# Patient Record
Sex: FEMALE | Race: BLACK OR AFRICAN AMERICAN | NOT HISPANIC OR LATINO | URBAN - METROPOLITAN AREA
[De-identification: names, ages, dates, MRNs, and addresses within clinical notes are randomized per-mention and may not be internally consistent; named-entity substitution may affect disease eponyms.]

---

## 2021-06-28 PROCEDURE — 99283 EMERGENCY DEPT VISIT LOW MDM: CPT | Mod: 25

## 2021-06-29 ENCOUNTER — APPOINTMENT (EMERGENCY)
Dept: RADIOLOGY | Facility: HOSPITAL | Age: 26
End: 2021-06-29
Attending: EMERGENCY MEDICINE
Payer: COMMERCIAL

## 2021-06-29 ENCOUNTER — HOSPITAL ENCOUNTER (EMERGENCY)
Facility: HOSPITAL | Age: 26
Discharge: HOME | End: 2021-06-29
Attending: EMERGENCY MEDICINE
Payer: COMMERCIAL

## 2021-06-29 VITALS
HEART RATE: 78 BPM | OXYGEN SATURATION: 98 % | DIASTOLIC BLOOD PRESSURE: 87 MMHG | RESPIRATION RATE: 16 BRPM | TEMPERATURE: 98 F | SYSTOLIC BLOOD PRESSURE: 120 MMHG

## 2021-06-29 DIAGNOSIS — R11.2 NON-INTRACTABLE VOMITING WITH NAUSEA, UNSPECIFIED VOMITING TYPE: ICD-10-CM

## 2021-06-29 DIAGNOSIS — Z3A.01 LESS THAN 8 WEEKS GESTATION OF PREGNANCY: Primary | ICD-10-CM

## 2021-06-29 DIAGNOSIS — R07.89 ATYPICAL CHEST PAIN: ICD-10-CM

## 2021-06-29 LAB
ANION GAP SERPL CALC-SCNC: 8 MEQ/L (ref 3–15)
BASOPHILS # BLD: 0.06 K/UL (ref 0.01–0.1)
BASOPHILS NFR BLD: 0.4 %
BUN SERPL-MCNC: 6 MG/DL (ref 8–20)
CALCIUM SERPL-MCNC: 9.6 MG/DL (ref 8.9–10.3)
CHLORIDE SERPL-SCNC: 99 MEQ/L (ref 98–109)
CO2 SERPL-SCNC: 25 MEQ/L (ref 22–32)
CREAT SERPL-MCNC: 0.8 MG/DL (ref 0.6–1.1)
DIFFERENTIAL METHOD BLD: ABNORMAL
EOSINOPHIL # BLD: 0.08 K/UL (ref 0.04–0.36)
EOSINOPHIL NFR BLD: 0.6 %
ERYTHROCYTE [DISTWIDTH] IN BLOOD BY AUTOMATED COUNT: 13.3 % (ref 11.7–14.4)
GFR SERPL CREATININE-BSD FRML MDRD: >60 ML/MIN/1.73M*2
GLUCOSE SERPL-MCNC: 98 MG/DL (ref 70–99)
HCG UR QL: POSITIVE
HCT VFR BLDCO AUTO: 36.3 % (ref 35–45)
HGB BLD-MCNC: 11.7 G/DL (ref 11.8–15.7)
IMM GRANULOCYTES # BLD AUTO: 0.03 K/UL (ref 0–0.08)
IMM GRANULOCYTES NFR BLD AUTO: 0.2 %
LYMPHOCYTES # BLD: 2.34 K/UL (ref 1.2–3.5)
LYMPHOCYTES NFR BLD: 17.3 %
MCH RBC QN AUTO: 24.7 PG (ref 28–33.2)
MCHC RBC AUTO-ENTMCNC: 32.2 G/DL (ref 32.2–35.5)
MCV RBC AUTO: 76.6 FL (ref 83–98)
MONOCYTES # BLD: 0.69 K/UL (ref 0.28–0.8)
MONOCYTES NFR BLD: 5.1 %
NEUTROPHILS # BLD: 10.31 K/UL (ref 1.7–7)
NEUTS SEG NFR BLD: 76.4 %
NRBC BLD-RTO: 0 %
PDW BLD AUTO: 10.1 FL (ref 9.4–12.3)
PLATELET # BLD AUTO: 346 K/UL (ref 150–369)
POTASSIUM SERPL-SCNC: 3.9 MEQ/L (ref 3.6–5.1)
RBC # BLD AUTO: 4.74 M/UL (ref 3.93–5.22)
SODIUM SERPL-SCNC: 132 MEQ/L (ref 136–144)
WBC # BLD AUTO: 13.51 K/UL (ref 3.8–10.5)

## 2021-06-29 PROCEDURE — 36415 COLL VENOUS BLD VENIPUNCTURE: CPT

## 2021-06-29 PROCEDURE — 71045 X-RAY EXAM CHEST 1 VIEW: CPT

## 2021-06-29 PROCEDURE — 63700000 HC SELF-ADMINISTRABLE DRUG: Performed by: EMERGENCY MEDICINE

## 2021-06-29 PROCEDURE — 82947 ASSAY GLUCOSE BLOOD QUANT: CPT

## 2021-06-29 PROCEDURE — 84703 CHORIONIC GONADOTROPIN ASSAY: CPT | Performed by: EMERGENCY MEDICINE

## 2021-06-29 PROCEDURE — 93005 ELECTROCARDIOGRAM TRACING: CPT | Performed by: EMERGENCY MEDICINE

## 2021-06-29 PROCEDURE — 80048 BASIC METABOLIC PNL TOTAL CA: CPT | Performed by: EMERGENCY MEDICINE

## 2021-06-29 PROCEDURE — 84703 CHORIONIC GONADOTROPIN ASSAY: CPT

## 2021-06-29 PROCEDURE — 80048 BASIC METABOLIC PNL TOTAL CA: CPT

## 2021-06-29 PROCEDURE — 85025 COMPLETE CBC W/AUTO DIFF WBC: CPT | Performed by: EMERGENCY MEDICINE

## 2021-06-29 PROCEDURE — 85025 COMPLETE CBC W/AUTO DIFF WBC: CPT

## 2021-06-29 RX ORDER — ONDANSETRON 4 MG/1
4 TABLET, ORALLY DISINTEGRATING ORAL EVERY 8 HOURS PRN
Qty: 12 TABLET | Refills: 0 | Status: SHIPPED | OUTPATIENT
Start: 2021-06-29 | End: 2022-11-06

## 2021-06-29 RX ORDER — FAMOTIDINE 20 MG/1
20 TABLET, FILM COATED ORAL ONCE
Status: COMPLETED | OUTPATIENT
Start: 2021-06-29 | End: 2021-06-29

## 2021-06-29 RX ADMIN — FAMOTIDINE 20 MG: 20 TABLET ORAL at 03:00

## 2021-06-29 ASSESSMENT — ENCOUNTER SYMPTOMS: VOMITING: 1

## 2021-06-29 NOTE — ED ATTESTATION NOTE
I have personally seen and examined the patient.  I reviewed and agree with physician assistant / nurse practitioner’s assessment and plan of care    My examination, assessment, and plan of care of  is as follows:     Patient has had nausea and vomiting for the last 3 days.  She was unaware that she was pregnant her last menstrual period was about 6 weeks ago.  No diarrhea.  She does have some mild blood streaks in last vomit.  She complains of mild chest discomfort and shortness of breath.  Pain is not pleuritic pain is somewhat reproducible with palpation no lower extremity tenderness or swelling in the lower extremities no history of DVT    Exam  Patient is awake alert oriented no acute distress  Lungs clear bilateral auscultation  Heart regular rate and rhythm  Abdomen soft nontender nondistended  No lower extremity edema no calf tenderness no cords palpated    Plan  Lab work is fairly unremarkable, patient is pregnant was explains her nausea and vomiting will check chest x-ray and EKG at patient's request     Ryne Bailon MD  06/29/21 6908

## 2021-06-29 NOTE — ED PROVIDER NOTES
Emergency Medicine Note  HPI   HISTORY OF PRESENT ILLNESS       The patient is a 26-year-old female with no significant past medical history who presents emergency room with multiple complaints.  She states that she has had 2 weeks of nausea and vomiting when she eats.  She states that she feels like there is a knot in her throat and the food gets stuck when she eats.  She states that over the past 3 days symptoms have gotten worse.  She also states that she has had some nasal congestion and chest pain.  She states that tonight she noticed streaks of blood in the vomit.  She has not had any fever, chills, cough, diarrhea, change in urination.           History provided by:  Patient  Vomiting  Associated symptoms: abdominal pain    Associated symptoms: no cough and no headaches          Patient History   PAST HISTORY     Reviewed from Nursing Triage:      History reviewed. No pertinent past medical history.    History reviewed. No pertinent surgical history.    History reviewed. No pertinent family history.    Social History     Tobacco Use   • Smoking status: Never Smoker   Substance Use Topics   • Alcohol use: Never   • Drug use: Never         Review of Systems   REVIEW OF SYSTEMS     Review of Systems   Constitutional: Positive for fatigue.   HENT: Positive for congestion.    Respiratory: Positive for shortness of breath. Negative for cough and choking.    Cardiovascular: Positive for chest pain. Negative for palpitations and leg swelling.   Gastrointestinal: Positive for abdominal pain, nausea and vomiting.   Genitourinary: Negative for difficulty urinating, dysuria, urgency and vaginal bleeding.   Musculoskeletal: Negative for back pain, neck pain and neck stiffness.   Neurological: Negative for syncope, light-headedness, numbness and headaches.         VITALS     ED Vitals    Date/Time Temp Pulse Resp BP SpO2 [facility]   06/29/21 0340 36.7 °C (98 °F) 78 16 120/87 98 % MMM   06/29/21 0000 37.2 °C (98.9 °F) 70 16  118/60 99 % MJM        Pulse Ox %: 99 % (06/29/21 0209)  Pulse Ox Interpretation: Normal (06/29/21 0209)  Heart Rate: 70 (06/29/21 0209)  Rhythm Strip Interpretation: Normal Sinus Rhythm (06/29/21 0209)     Physical Exam   PHYSICAL EXAM     Physical Exam  Vitals and nursing note reviewed.   Constitutional:       General: She is not in acute distress.     Appearance: Normal appearance. She is well-developed.   HENT:      Head: Atraumatic.      Mouth/Throat:      Lips: Pink.      Mouth: Mucous membranes are moist.      Pharynx: Oropharynx is clear.   Eyes:      Extraocular Movements: Extraocular movements intact.      Conjunctiva/sclera: Conjunctivae normal.      Pupils: Pupils are equal, round, and reactive to light.   Cardiovascular:      Rate and Rhythm: Normal rate and regular rhythm.      Pulses: Normal pulses.      Heart sounds: Normal heart sounds.      Comments: No calf tenderness or cords palpated  Pulmonary:      Effort: Pulmonary effort is normal. No respiratory distress.      Breath sounds: Normal breath sounds. No wheezing, rhonchi or rales.   Chest:      Chest wall: No tenderness.   Abdominal:      General: Abdomen is flat.      Tenderness: There is no abdominal tenderness. There is no right CVA tenderness, left CVA tenderness, guarding or rebound.   Musculoskeletal:         General: No deformity. Normal range of motion.      Cervical back: Normal range of motion and neck supple. No rigidity.      Right lower leg: No edema.      Left lower leg: No edema.   Skin:     General: Skin is warm and dry.      Capillary Refill: Capillary refill takes less than 2 seconds.   Neurological:      General: No focal deficit present.      Mental Status: She is alert and oriented to person, place, and time.   Psychiatric:         Behavior: Behavior normal. Behavior is cooperative.           PROCEDURES     Procedures     DATA     Results     Procedure Component Value Units Date/Time    Basic metabolic panel [[id number]]   (Abnormal) Collected: 06/29/21 0113    Specimen: Blood, Venous Updated: 06/29/21 0145     Sodium 132 mEQ/L      Potassium 3.9 mEQ/L      Comment: Results obtained on plasma. Plasma Potassium values may be up to 0.4 mEQ/L less than serum values. The differences may be greater for patients with high platelet or white cell counts.        Chloride 99 mEQ/L      CO2 25 mEQ/L      BUN 6 mg/dL      Creatinine 0.8 mg/dL      Glucose 98 mg/dL      Calcium 9.6 mg/dL      eGFR >60.0 mL/min/1.73m*2      Anion Gap 8 mEQ/L     BhCG, Serum, Qual [[id number]]  (Abnormal) Collected: 06/29/21 0113    Specimen: Blood, Venous Updated: 06/29/21 0143     Preg Test, Serum Positive    CBC and differential [[id number]]  (Abnormal) Collected: 06/29/21 0113    Specimen: Blood, Venous Updated: 06/29/21 0120     WBC 13.51 K/uL      RBC 4.74 M/uL      Hemoglobin 11.7 g/dL      Hematocrit 36.3 %      MCV 76.6 fL      MCH 24.7 pg      MCHC 32.2 g/dL      RDW 13.3 %      Platelets 346 K/uL      MPV 10.1 fL      Differential Type Auto     nRBC 0.0 %      Immature Granulocytes 0.2 %      Neutrophils 76.4 %      Lymphocytes 17.3 %      Monocytes 5.1 %      Eosinophils 0.6 %      Basophils 0.4 %      Immature Granulocytes, Absolute 0.03 K/uL      Neutrophils, Absolute 10.31 K/uL      Lymphocytes, Absolute 2.34 K/uL      Monocytes, Absolute 0.69 K/uL      Eosinophils, Absolute 0.08 K/uL      Basophils, Absolute 0.06 K/uL           Imaging Results          X-RAY CHEST 1 VIEW (Final result)  Result time 06/29/21 07:56:57    Final result                 Impression:    IMPRESSION:   Radiographically normal chest.    COMMENT:   A single portable AP erect chest radiograph is interpreted without  comparison. The cardiomediastinal contour is normal. The lungs are clear  bilaterally. The osseous thorax and surrounding soft tissues are unremarkable.               Narrative:    CLINICAL HISTORY:    26-year-old female with chest pain.                    ED  Interpretation    Chest x-ray is unremarkable                              ECG 12 lead   ED Interpretation   EKG shows a normal sinus rhythm at a rate of 61 bpm with normal axis normal ST segments and normal T waves                Scoring tools                                 ED Course & MDM   MDM / ED COURSE and CLINICAL IMPRESSIONS     MDM    ED Course as of Jun 30 0114 Tue Jun 29, 2021   0233 Patient is aware that she is pregnant her mom is aware at this time.  She will mom in the room to have discussion.  Patient wants a chest x-ray and EKG for her very minimal chest discomfort.  I explained there is some risk to the baby she insisted on the x-ray.   Preg Test, Serum(!): Positive [RS]   0257 Chest x-ray is unremarkable EKG unremarkable lab work is unremarkable patient has not vomited here will discharge with Zofran and follow-up with OB.    [RS]      ED Course User Index  [RS] Ryne Bailon MD         Clinical Impressions as of Jun 30 0114   Less than 8 weeks gestation of pregnancy   Non-intractable vomiting with nausea, unspecified vomiting type   Atypical chest pain            Hilary Galvin PA C  06/30/21 0114

## 2021-06-30 LAB
ATRIAL RATE: 61
P AXIS: 39
PR INTERVAL: 162
QRS DURATION: 82
QT INTERVAL: 388
QTC CALCULATION(BAZETT): 390
R AXIS: 56
T WAVE AXIS: 12
VENTRICULAR RATE: 61

## 2021-06-30 ASSESSMENT — ENCOUNTER SYMPTOMS
LIGHT-HEADEDNESS: 0
BACK PAIN: 0
NUMBNESS: 0
FATIGUE: 1
DIFFICULTY URINATING: 0
ABDOMINAL PAIN: 1
COUGH: 0
NECK STIFFNESS: 0
PALPITATIONS: 0
NECK PAIN: 0
DYSURIA: 0
NAUSEA: 1
HEADACHES: 0
CHOKING: 0
SHORTNESS OF BREATH: 1

## 2022-11-06 ENCOUNTER — APPOINTMENT (EMERGENCY)
Dept: RADIOLOGY | Facility: HOSPITAL | Age: 27
End: 2022-11-06
Payer: COMMERCIAL

## 2022-11-06 ENCOUNTER — HOSPITAL ENCOUNTER (EMERGENCY)
Facility: HOSPITAL | Age: 27
Discharge: HOME | End: 2022-11-07
Attending: STUDENT IN AN ORGANIZED HEALTH CARE EDUCATION/TRAINING PROGRAM | Admitting: STUDENT IN AN ORGANIZED HEALTH CARE EDUCATION/TRAINING PROGRAM
Payer: COMMERCIAL

## 2022-11-06 DIAGNOSIS — O21.0 HYPEREMESIS OF PREGNANCY: Primary | ICD-10-CM

## 2022-11-06 LAB
ANION GAP SERPL CALC-SCNC: 12 MEQ/L (ref 3–15)
BASOPHILS # BLD: 0.05 K/UL (ref 0.01–0.1)
BASOPHILS NFR BLD: 0.4 %
BUN SERPL-MCNC: 8 MG/DL (ref 8–20)
CALCIUM SERPL-MCNC: 9.7 MG/DL (ref 8.9–10.3)
CHLORIDE SERPL-SCNC: 100 MEQ/L (ref 98–109)
CO2 SERPL-SCNC: 21 MEQ/L (ref 22–32)
CREAT SERPL-MCNC: 0.7 MG/DL (ref 0.6–1.1)
DIFFERENTIAL METHOD BLD: ABNORMAL
EOSINOPHIL # BLD: 0.08 K/UL (ref 0.04–0.36)
EOSINOPHIL NFR BLD: 0.7 %
ERYTHROCYTE [DISTWIDTH] IN BLOOD BY AUTOMATED COUNT: 13.9 % (ref 11.7–14.4)
FLUAV RNA SPEC QL NAA+PROBE: NEGATIVE
FLUBV RNA SPEC QL NAA+PROBE: NEGATIVE
GFR SERPL CREATININE-BSD FRML MDRD: >60 ML/MIN/1.73M*2
GLUCOSE SERPL-MCNC: 88 MG/DL (ref 70–99)
HCG SERPL-ACNC: ABNORMAL IU/L (MIU/ML)
HCT VFR BLDCO AUTO: 36.2 % (ref 35–45)
HGB BLD-MCNC: 11.6 G/DL (ref 11.8–15.7)
IMM GRANULOCYTES # BLD AUTO: 0.07 K/UL (ref 0–0.08)
IMM GRANULOCYTES NFR BLD AUTO: 0.6 %
LYMPHOCYTES # BLD: 2.13 K/UL (ref 1.2–3.5)
LYMPHOCYTES NFR BLD: 17.4 %
MCH RBC QN AUTO: 25.1 PG (ref 28–33.2)
MCHC RBC AUTO-ENTMCNC: 32 G/DL (ref 32.2–35.5)
MCV RBC AUTO: 78.4 FL (ref 83–98)
MONOCYTES # BLD: 0.7 K/UL (ref 0.28–0.8)
MONOCYTES NFR BLD: 5.7 %
NEUTROPHILS # BLD: 9.2 K/UL (ref 1.7–7)
NEUTS SEG NFR BLD: 75.2 %
NRBC BLD-RTO: 0 %
PDW BLD AUTO: 10 FL (ref 9.4–12.3)
PLATELET # BLD AUTO: 332 K/UL (ref 150–369)
POTASSIUM SERPL-SCNC: 3.7 MEQ/L (ref 3.6–5.1)
RBC # BLD AUTO: 4.62 M/UL (ref 3.93–5.22)
RSV RNA SPEC QL NAA+PROBE: NEGATIVE
SARS-COV-2 RNA RESP QL NAA+PROBE: NEGATIVE
SODIUM SERPL-SCNC: 133 MEQ/L (ref 136–144)
WBC # BLD AUTO: 12.23 K/UL (ref 3.8–10.5)

## 2022-11-06 PROCEDURE — 99284 EMERGENCY DEPT VISIT MOD MDM: CPT | Mod: 25

## 2022-11-06 PROCEDURE — 25800000 HC PHARMACY IV SOLUTIONS: Performed by: PHYSICIAN ASSISTANT

## 2022-11-06 PROCEDURE — 76817 TRANSVAGINAL US OBSTETRIC: CPT

## 2022-11-06 PROCEDURE — 87637 SARSCOV2&INF A&B&RSV AMP PRB: CPT | Performed by: STUDENT IN AN ORGANIZED HEALTH CARE EDUCATION/TRAINING PROGRAM

## 2022-11-06 PROCEDURE — 3E033GC INTRODUCTION OF OTHER THERAPEUTIC SUBSTANCE INTO PERIPHERAL VEIN, PERCUTANEOUS APPROACH: ICD-10-PCS | Performed by: STUDENT IN AN ORGANIZED HEALTH CARE EDUCATION/TRAINING PROGRAM

## 2022-11-06 PROCEDURE — 84702 CHORIONIC GONADOTROPIN TEST: CPT

## 2022-11-06 PROCEDURE — 76815 OB US LIMITED FETUS(S): CPT

## 2022-11-06 PROCEDURE — 3E0337Z INTRODUCTION OF ELECTROLYTIC AND WATER BALANCE SUBSTANCE INTO PERIPHERAL VEIN, PERCUTANEOUS APPROACH: ICD-10-PCS | Performed by: STUDENT IN AN ORGANIZED HEALTH CARE EDUCATION/TRAINING PROGRAM

## 2022-11-06 PROCEDURE — 96374 THER/PROPH/DIAG INJ IV PUSH: CPT

## 2022-11-06 PROCEDURE — 93975 VASCULAR STUDY: CPT

## 2022-11-06 PROCEDURE — 36415 COLL VENOUS BLD VENIPUNCTURE: CPT

## 2022-11-06 PROCEDURE — 85025 COMPLETE CBC W/AUTO DIFF WBC: CPT

## 2022-11-06 PROCEDURE — 84702 CHORIONIC GONADOTROPIN TEST: CPT | Performed by: STUDENT IN AN ORGANIZED HEALTH CARE EDUCATION/TRAINING PROGRAM

## 2022-11-06 PROCEDURE — 80048 BASIC METABOLIC PNL TOTAL CA: CPT

## 2022-11-06 PROCEDURE — 80048 BASIC METABOLIC PNL TOTAL CA: CPT | Performed by: STUDENT IN AN ORGANIZED HEALTH CARE EDUCATION/TRAINING PROGRAM

## 2022-11-06 PROCEDURE — 96361 HYDRATE IV INFUSION ADD-ON: CPT

## 2022-11-06 PROCEDURE — 87637 SARSCOV2&INF A&B&RSV AMP PRB: CPT

## 2022-11-06 PROCEDURE — 85025 COMPLETE CBC W/AUTO DIFF WBC: CPT | Performed by: STUDENT IN AN ORGANIZED HEALTH CARE EDUCATION/TRAINING PROGRAM

## 2022-11-06 PROCEDURE — 96375 TX/PRO/DX INJ NEW DRUG ADDON: CPT

## 2022-11-06 PROCEDURE — 93005 ELECTROCARDIOGRAM TRACING: CPT

## 2022-11-06 PROCEDURE — 63600000 HC DRUGS/DETAIL CODE: Performed by: PHYSICIAN ASSISTANT

## 2022-11-06 PROCEDURE — 93005 ELECTROCARDIOGRAM TRACING: CPT | Performed by: STUDENT IN AN ORGANIZED HEALTH CARE EDUCATION/TRAINING PROGRAM

## 2022-11-06 RX ORDER — METOCLOPRAMIDE HYDROCHLORIDE 5 MG/ML
10 INJECTION INTRAMUSCULAR; INTRAVENOUS ONCE
Status: COMPLETED | OUTPATIENT
Start: 2022-11-06 | End: 2022-11-06

## 2022-11-06 RX ADMIN — SODIUM CHLORIDE 1000 ML: 9 INJECTION, SOLUTION INTRAVENOUS at 21:27

## 2022-11-06 RX ADMIN — METOCLOPRAMIDE 10 MG: 5 INJECTION, SOLUTION INTRAMUSCULAR; INTRAVENOUS at 21:30

## 2022-11-06 ASSESSMENT — ENCOUNTER SYMPTOMS
PREGNANCY PROBLEM: 1
ABDOMINAL PAIN: 1
FLANK PAIN: 0
FREQUENCY: 0
DIFFICULTY URINATING: 0
NECK STIFFNESS: 0
FEVER: 0
FATIGUE: 0
LIGHT-HEADEDNESS: 0
BACK PAIN: 0
COLOR CHANGE: 0
VOMITING: 1
NECK PAIN: 0
NAUSEA: 1
SORE THROAT: 0
HEADACHES: 0
COUGH: 0

## 2022-11-07 VITALS
BODY MASS INDEX: 25.74 KG/M2 | TEMPERATURE: 99.9 F | HEIGHT: 67 IN | WEIGHT: 164 LBS | DIASTOLIC BLOOD PRESSURE: 68 MMHG | OXYGEN SATURATION: 98 % | RESPIRATION RATE: 16 BRPM | SYSTOLIC BLOOD PRESSURE: 100 MMHG | HEART RATE: 60 BPM

## 2022-11-07 LAB
ATRIAL RATE: 65
P AXIS: 54
PR INTERVAL: 146
QRS DURATION: 94
QT INTERVAL: 398
QTC CALCULATION(BAZETT): 413
R AXIS: 63
T WAVE AXIS: 29
VENTRICULAR RATE: 65

## 2022-11-07 PROCEDURE — 63600000 HC DRUGS/DETAIL CODE: Performed by: PHYSICIAN ASSISTANT

## 2022-11-07 PROCEDURE — 93010 ELECTROCARDIOGRAM REPORT: CPT | Performed by: INTERNAL MEDICINE

## 2022-11-07 RX ORDER — METOCLOPRAMIDE 10 MG/1
10 TABLET ORAL
Qty: 15 TABLET | Refills: 0 | Status: SHIPPED | OUTPATIENT
Start: 2022-11-07 | End: 2023-04-11 | Stop reason: ALTCHOICE

## 2022-11-07 RX ORDER — ONDANSETRON HYDROCHLORIDE 2 MG/ML
4 INJECTION, SOLUTION INTRAVENOUS ONCE
Status: COMPLETED | OUTPATIENT
Start: 2022-11-07 | End: 2022-11-07

## 2022-11-07 RX ADMIN — ONDANSETRON 4 MG: 2 INJECTION INTRAMUSCULAR; INTRAVENOUS at 01:03

## 2022-11-07 NOTE — DISCHARGE INSTRUCTIONS
Take Reglan every 6 hours as needed for nausea and vomiting  Make sure you follow-up with your GYN as we discussed.   As discussed, you should have your urine checked for a urinary tract infection.    Return to the ER if any increase in fever uncontrolled medications, worsening abdominal pain, vomiting, not urinating, pain with urinating, vaginal bleeding, headache, dizziness, any concerns.

## 2022-11-07 NOTE — ED ATTESTATION NOTE
"I saw and evaluated the patient, participated in the management, and agree with the findings in the above note. We discussed the case and the treatment plan.  Exam:  Patient Vitals for the past 72 hrs:   BP Temp Temp src Pulse Resp SpO2 Height Weight   11/06/22 2130 (!) 125/58 -- -- -- 18 99 % -- --   11/06/22 1839 -- 37.7 °C (99.9 °F) Tympanic 68 16 98 % 1.702 m (5' 7\") 74.4 kg (164 lb)   VS reviewed, nad, nontoxic, head at/nc, normal speech, no resp distress, normal skin tone, coordinated movement, gravid abdomen c/w dates, generalized lower abdominal discomfort L>R, no abdominal distention. Extremity exam unremarkable.      Cody Montgomery, DO  11/06/22 2142    "

## 2022-11-07 NOTE — ED PROVIDER NOTES
Emergency Medicine Note  HPI   HISTORY OF PRESENT ILLNESS       Patient is a pregnant 27-year-old  history of an ectopic and a miscarriage who presents emergency room with nausea, vomiting and shortness of breath.  She states that she is about 5 weeks pregnant.  She has not had an ultrasound with this pregnancy.  She states for the past 3 days she has not been able to tolerate anything p.o. due to the nausea and vomiting.  She has had some lower abdominal cramping.  Denies vaginal bleeding, urinary symptoms.  She states this feels different than her ectopic and her miscarriage.  She reports that her daughter is also being seen in the ER for URI symptoms and is concerned that she has COVID.  The patient has not had chest pain, cough, congestion, sore throat, headache.    History provided by:  Patient  Vomiting  Associated symptoms: abdominal pain    Associated symptoms: no cough, no fever, no headaches and no sore throat    Pregnancy Problem  Associated symptoms: abdominal pain, nausea and vomiting    Associated symptoms: no chest pain, no congestion, no cough, no fatigue, no fever, no headaches, no rash and no sore throat          Patient History   PAST HISTORY     Reviewed from Nursing Triage:       History reviewed. No pertinent past medical history.    Past Surgical History:   Procedure Laterality Date    D&C FIRST TRIMESTER / TX INCOMPLETE / MISSED / SEPTIC / INDUCED          History reviewed. No pertinent family history.    Social History     Tobacco Use    Smoking status: Former     Types: Cigarettes    Smokeless tobacco: Never   Substance Use Topics    Alcohol use: Never    Drug use: Never         Review of Systems   REVIEW OF SYSTEMS     Review of Systems   Constitutional: Negative for fatigue and fever.   HENT: Negative for congestion and sore throat.    Respiratory: Negative for cough.    Cardiovascular: Negative for chest pain.   Gastrointestinal: Positive for abdominal pain, nausea and  vomiting.   Genitourinary: Negative for decreased urine volume, difficulty urinating, flank pain, frequency, urgency and vaginal bleeding.   Musculoskeletal: Negative for back pain, neck pain and neck stiffness.   Skin: Negative for color change, pallor and rash.   Neurological: Negative for syncope, light-headedness and headaches.         VITALS     ED Vitals    Date/Time Temp Pulse Resp BP SpO2 [facility]   11/07/22 0159 -- -- 16 100/68 98 % [facility]   11/06/22 2347 -- 60 16 107/53 100 % [facility]   11/06/22 2130 -- -- 18 125/58 99 % CW   11/06/22 1839 37.7 °C (99.9 °F) 68 16 -- 98 % AMT        Pulse Ox %: 98 % (11/06/22 1839)  Pulse Ox Interpretation: Normal (11/06/22 1839)           Physical Exam   PHYSICAL EXAM     Physical Exam  Vitals and nursing note reviewed.   Constitutional:       General: She is not in acute distress.     Appearance: Normal appearance. She is well-developed.   HENT:      Head: Atraumatic.   Eyes:      Conjunctiva/sclera: Conjunctivae normal.   Cardiovascular:      Rate and Rhythm: Normal rate and regular rhythm.   Pulmonary:      Effort: Pulmonary effort is normal. No respiratory distress.      Breath sounds: Normal breath sounds. No wheezing, rhonchi or rales.   Abdominal:      Palpations: Abdomen is soft.      Tenderness: There is abdominal tenderness in the right lower quadrant, suprapubic area and left lower quadrant. There is no right CVA tenderness, left CVA tenderness, guarding or rebound.   Musculoskeletal:         General: No deformity. Normal range of motion.      Cervical back: Normal range of motion and neck supple.   Skin:     General: Skin is warm and dry.      Capillary Refill: Capillary refill takes less than 2 seconds.   Neurological:      General: No focal deficit present.      Mental Status: She is alert and oriented to person, place, and time.   Psychiatric:         Behavior: Behavior normal. Behavior is cooperative.           PROCEDURES     Procedures     DATA     Results      Procedure Component Value Units Date/Time    Extra Tubes [[id number]] Collected: 11/06/22 1902    Specimen: Blood, Venous Updated: 11/07/22 0401    Narrative:      The following orders were created for panel order Extra Tubes.  Procedure                               Abnormality         Status                     ---------                               -----------         ------                     Extra Tube Lt Green[[id number]]                              Final result                 Please view results for these tests on the individual orders.    Extra Tube Lt Green [[id number]] Collected: 11/06/22 1902    Specimen: Blood, Venous Updated: 11/07/22 0401    BhCG, Serum, Quant [[id number]]  (Abnormal) Collected: 11/06/22 1902    Specimen: Blood, Venous Updated: 11/06/22 2009     hCG Quant 32,972.0 IU/L (mIU/mL)      Comment: Approx. Gestational Age   Approx. hCG Range         (Weeks)                  (IU/L)          0.2-1                    5-50            1-2                               2-3                  100-5000            3-4                  500-10,000            4-5                 1000-50,000            5-6               10,000-100,000            6-8               15,000-200,000            8-12              10,000-100,000       Basic metabolic panel [[id number]]  (Abnormal) Collected: 11/06/22 1902    Specimen: Blood, Venous Updated: 11/06/22 2006     Sodium 133 mEQ/L      Potassium 3.7 mEQ/L      Comment: Results obtained on plasma. Plasma Potassium values may be up to 0.4 mEQ/L less than serum values. The differences may be greater for patients with high platelet or white cell counts.        Chloride 100 mEQ/L      CO2 21 mEQ/L      BUN 8 mg/dL      Creatinine 0.7 mg/dL      Glucose 88 mg/dL      Calcium 9.7 mg/dL      eGFR >60.0 mL/min/1.73m*2      Anion Gap 12 mEQ/L     SARS-CoV-2 (COVID-19), PCR Nasopharynx [[id number]]  (Normal) Collected: 11/06/22 1844    Specimen: Nasopharyngeal Swab from  Nasopharynx Updated: 11/06/22 1943    Narrative:      The following orders were created for panel order SARS-CoV-2 (COVID-19), PCR Nasopharynx.  Procedure                               Abnormality         Status                     ---------                               -----------         ------                     SARS-COV-2 (COVID-19)/ F...[[id number]]  Normal              Final result                 Please view results for these tests on the individual orders.    SARS-COV-2 (COVID-19)/ FLU A/B, AND RSV, PCR Nasopharynx [[id number]]  (Normal) Collected: 11/06/22 1844    Specimen: Nasopharyngeal Swab from Nasopharynx Updated: 11/06/22 1943     SARS-CoV-2 (COVID-19) Negative     Influenza A Negative     Influenza B Negative     Respiratory Syncytial Virus Negative    Narrative:      Testing performed using real-time PCR for detection of COVID-19. EUA approved validation studies performed on site.     CBC and differential [[id number]]  (Abnormal) Collected: 11/06/22 1902    Specimen: Blood, Venous Updated: 11/06/22 1916     WBC 12.23 K/uL      RBC 4.62 M/uL      Hemoglobin 11.6 g/dL      Hematocrit 36.2 %      MCV 78.4 fL      MCH 25.1 pg      MCHC 32.0 g/dL      RDW 13.9 %      Platelets 332 K/uL      MPV 10.0 fL      Differential Type Auto     nRBC 0.0 %      Immature Granulocytes 0.6 %      Neutrophils 75.2 %      Lymphocytes 17.4 %      Monocytes 5.7 %      Eosinophils 0.7 %      Basophils 0.4 %      Immature Granulocytes, Absolute 0.07 K/uL      Neutrophils, Absolute 9.20 K/uL      Lymphocytes, Absolute 2.13 K/uL      Monocytes, Absolute 0.70 K/uL      Eosinophils, Absolute 0.08 K/uL      Basophils, Absolute 0.05 K/uL           Imaging Results          ULTRASOUND PREGNANCY < 14 WEEKS TRANSABDOMINAL LIMITED (Final result)  Result time 11/07/22 07:20:26    Final result                 Impression:    IMPRESSION:  1. Viable single intrauterine gestation corresponding to 6 weeks and 3 days by  crown-rump length.  2.  Substantial perigestational hemorrhage that measures 2.9 x 2.2 x 2.0 cm.  3. Right ovarian complex probable corpus luteum cyst that measures 2.2 x 2.0 x  1.6 cm.    CONSULTATION: Preliminary findings were reported by Zuleika Townsend DO on  Nov 07, 2022 at 12:09 AM.    COMMENT:    UTERUS:  Position: Anteverted.  Size: 12 x 7.9 x 6.4 cm.  Contents: Intrauterine gestational sac containing a fetal pole and yolk sac.    GESTATIONAL SAC:  Mean diameter:  1.71 cm, corresponding to 6 weeks and 0 days gestation.  Perigestational hemorrhage: See impression.    FETAL POLE:  Crown-rump length: 0.58 cm, corresponding to 6 weeks 3 days gestation.  Heart rate: 115 beats per minute and regular.    OVARIES:  Right:  Size:  2.8 x 2.8 x 1.9 cm  Cysts or mass: See impression.  Left:  Size:  2.9 x 2.4 x 2.0 cm  Cysts or mass: None.               Narrative:    STUDY:   Real-time ultrasound examination the pelvis performed with both  transabdominal and transvaginal techniques. The transvaginal component was added  to further evaluate the ovaries, endometrium, fetus, pelvic organs.    CLINICAL HISTORY: 27-year-old female with abdominal pain. LMP is 9/30/2022 (EGA  5 weeks 2 days).    COMPARISON: None.                    ED Interpretation    Exam(s): preg <14 weeks US  MR#: [id number]    History: n/v hx ectopic    Preliminary Impression:    Live intrauterine pregnancy of 6 weeks and 2 days.  Fetal heart rate of 115 bpm.  Significant subchorionic hemorrhage of 2.2 x 2.0 x 2.9 cm.  Patent doppler flow within bilateral ovaries.  Small free fluid in the pelvis, likely physiological.    Interpreted by: Zuleika Townsend DO, Nov 07, 2022 12:09 AM    Yohana Kaye [id number], Nov 06, 2022                             ULTRASOUND PREGNANCY TRANSVAGINAL ONLY (Final result)  Result time 11/07/22 07:20:26    Final result                 Impression:    IMPRESSION:  1. Viable single intrauterine gestation corresponding to 6 weeks and 3 days  by  crown-rump length.  2. Substantial perigestational hemorrhage that measures 2.9 x 2.2 x 2.0 cm.  3. Right ovarian complex probable corpus luteum cyst that measures 2.2 x 2.0 x  1.6 cm.    CONSULTATION: Preliminary findings were reported by Zuleika Townsend DO on  Nov 07, 2022 at 12:09 AM.    COMMENT:    UTERUS:  Position: Anteverted.  Size: 12 x 7.9 x 6.4 cm.  Contents: Intrauterine gestational sac containing a fetal pole and yolk sac.    GESTATIONAL SAC:  Mean diameter:  1.71 cm, corresponding to 6 weeks and 0 days gestation.  Perigestational hemorrhage: See impression.    FETAL POLE:  Crown-rump length: 0.58 cm, corresponding to 6 weeks 3 days gestation.  Heart rate: 115 beats per minute and regular.    OVARIES:  Right:  Size:  2.8 x 2.8 x 1.9 cm  Cysts or mass: See impression.  Left:  Size:  2.9 x 2.4 x 2.0 cm  Cysts or mass: None.               Narrative:    STUDY:   Real-time ultrasound examination the pelvis performed with both  transabdominal and transvaginal techniques. The transvaginal component was added  to further evaluate the ovaries, endometrium, fetus, pelvic organs.    CLINICAL HISTORY: 27-year-old female with abdominal pain. LMP is 9/30/2022 (EGA  5 weeks 2 days).    COMPARISON: None.                               ULTRASOUND DOPPLER (Final result)  Result time 11/07/22 07:20:26    Final result                 Impression:    IMPRESSION:  1. Viable single intrauterine gestation corresponding to 6 weeks and 3 days by  crown-rump length.  2. Substantial perigestational hemorrhage that measures 2.9 x 2.2 x 2.0 cm.  3. Right ovarian complex probable corpus luteum cyst that measures 2.2 x 2.0 x  1.6 cm.    CONSULTATION: Preliminary findings were reported by Zuleika Townsend DO on  Nov 07, 2022 at 12:09 AM.    COMMENT:    UTERUS:  Position: Anteverted.  Size: 12 x 7.9 x 6.4 cm.  Contents: Intrauterine gestational sac containing a fetal pole and yolk sac.    GESTATIONAL SAC:  Mean diameter:  1.71  cm, corresponding to 6 weeks and 0 days gestation.  Perigestational hemorrhage: See impression.    FETAL POLE:  Crown-rump length: 0.58 cm, corresponding to 6 weeks 3 days gestation.  Heart rate: 115 beats per minute and regular.    OVARIES:  Right:  Size:  2.8 x 2.8 x 1.9 cm  Cysts or mass: See impression.  Left:  Size:  2.9 x 2.4 x 2.0 cm  Cysts or mass: None.               Narrative:    STUDY:   Real-time ultrasound examination the pelvis performed with both  transabdominal and transvaginal techniques. The transvaginal component was added  to further evaluate the ovaries, endometrium, fetus, pelvic organs.    CLINICAL HISTORY: 27-year-old female with abdominal pain. LMP is 2022 (EGA  5 weeks 2 days).    COMPARISON: None.                                ECG 12 lead   Final Result          Scoring tools                                  ED Course & MDM   MDM / ED COURSE / CLINICAL IMPRESSION / DISPO     MDM    ED Course as of 22   Sun 2022   1908 ECG 19:08 - nsr 65 bpm, nl axis, good rwp, no st changes, nonspecific t wave changes, qt/qtc 398/413 ms.  [NS]    Pt seen and evaluated along with PA-C.  currently approximately 10 weeks pregnant pending ob/gyn f/u 22; states nausea and vomiting with solid and fluid intake. Trying fluids and ginger ale. No antiemetic PTA. Plan for pelvic US and IVF; attempt Reglan. Will re-evaluate once results obtained. Q/C addressed. Agree with plan.  [NS]   Mon 2022   0021 Exam(s): preg <14 weeks US  MR#: [id number]    History: n/v hx ectopic    Preliminary Impression:    Live intrauterine pregnancy of 6 weeks and 2 days.  Fetal heart rate of 115 bpm.  Significant subchorionic hemorrhage of 2.2 x 2.0 x 2.9 cm.  Patent doppler flow within bilateral ovaries.  Small free fluid in the pelvis, likely physiological.    Interpreted by: Zuleika Townsend DO, 2022 12:09 AM    Yohana Kaye [id number], 2022 [LB]   0130 I updated the  patient on the ultrasound results.  I explained that we are waiting for a urine because a UTI in pregnancy can be dangerous.  You can also have a UTI without having UTI symptoms.  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  She will dip her urine today.  She does not want a wait for her urine now.  Patient understands the risks of a UTI in pregnancy return precautions discussed    Pt tolerated PO [LB]      ED Course User Index  [LB] Hilary Galvin PA C  [NS] Cody Montgomery, DO     Clinical Impression      Hyperemesis of pregnancy     _________________     ED Disposition   Discharge                   Hilary Galvin PA C  11/07/22 3311

## 2022-12-28 PROBLEM — D72.821 MONOCYTOSIS: Status: ACTIVE | Noted: 2022-12-28

## 2022-12-28 PROBLEM — F32.A DEPRESSIVE DISORDER: Status: ACTIVE | Noted: 2022-12-28

## 2022-12-28 PROBLEM — D50.9 IRON DEFICIENCY ANEMIA: Status: ACTIVE | Noted: 2022-12-28

## 2022-12-29 PROBLEM — D50.9 ANEMIA, IRON DEFICIENCY: Status: ACTIVE | Noted: 2022-12-29

## 2023-04-11 ENCOUNTER — APPOINTMENT (RX ONLY)
Dept: URBAN - METROPOLITAN AREA CLINIC 23 | Facility: CLINIC | Age: 28
Setting detail: DERMATOLOGY
End: 2023-04-11

## 2023-04-11 ENCOUNTER — OFFICE VISIT (OUTPATIENT)
Dept: ENDOCRINOLOGY | Facility: CLINIC | Age: 28
End: 2023-04-11
Payer: COMMERCIAL

## 2023-04-11 VITALS
BODY MASS INDEX: 25.87 KG/M2 | WEIGHT: 164.8 LBS | OXYGEN SATURATION: 98 % | HEIGHT: 67 IN | HEART RATE: 74 BPM | DIASTOLIC BLOOD PRESSURE: 78 MMHG | RESPIRATION RATE: 14 BRPM | TEMPERATURE: 98 F | SYSTOLIC BLOOD PRESSURE: 120 MMHG

## 2023-04-11 DIAGNOSIS — E04.1 THYROID NODULE: Primary | ICD-10-CM

## 2023-04-11 DIAGNOSIS — L72.0 EPIDERMAL CYST: ICD-10-CM

## 2023-04-11 PROCEDURE — ? REFERRAL

## 2023-04-11 PROCEDURE — ? COUNSELING

## 2023-04-11 PROCEDURE — 99202 OFFICE O/P NEW SF 15 MIN: CPT

## 2023-04-11 PROCEDURE — 3008F BODY MASS INDEX DOCD: CPT | Performed by: INTERNAL MEDICINE

## 2023-04-11 PROCEDURE — ? DEFER

## 2023-04-11 PROCEDURE — ? PHOTO-DOCUMENTATION

## 2023-04-11 PROCEDURE — 99204 OFFICE O/P NEW MOD 45 MIN: CPT | Performed by: INTERNAL MEDICINE

## 2023-04-11 RX ORDER — BICTEGRAVIR SODIUM, EMTRICITABINE, AND TENOFOVIR ALAFENAMIDE FUMARATE 50; 200; 25 MG/1; MG/1; MG/1
TABLET ORAL
COMMUNITY
Start: 2023-03-27 | End: 2023-04-11 | Stop reason: ALTCHOICE

## 2023-04-11 ASSESSMENT — LOCATION DETAILED DESCRIPTION DERM: LOCATION DETAILED: RIGHT LATERAL MALAR CHEEK

## 2023-04-11 ASSESSMENT — LOCATION SIMPLE DESCRIPTION DERM: LOCATION SIMPLE: RIGHT CHEEK

## 2023-04-11 ASSESSMENT — LOCATION ZONE DERM: LOCATION ZONE: FACE

## 2023-04-11 NOTE — PATIENT INSTRUCTIONS
Do neck ultrasound  at [place].  Please call  to schedule this test.       For Biopsy  Dr. Shantal Giordano MD  Radiologist  [address] [place] [address], [place] [address]  Kindly, call her coordinator Tomasa Bains at  to schedule thyroid biopsy.  [phone] (Fax)

## 2023-04-11 NOTE — PROGRESS NOTES
"Yohana Kaye is a 27 y.o. female who presents today for evaluation and management of thyroid nodule. Referred by Isela Baca MD.       History  ~ 2022 she felt lump in throat.  She was at the visit with no records.  At the end of the visit records were obtained and discussed  thyroid ultrasound  ~ 2022.  Noted right lower pole TR 5 3.5 cm solid nodule and biopsy was recommended    She did not have biopsy yet.  Here for consultation    She is currently 4 weeks gestation. Planning to see gyn.    Moved to [place] 2 weeks ago, came here for medical evaluation.    No prior history of thyroid disease  No compression symptoms (no dysphagia/SOB/pressure symptoms in neck)  Globus sensation    Family history of thyroid disease: dad ? Thyroid disease  Any exposure to XRT head/neck: no         No past medical history on file.  Past Surgical History:   Procedure Laterality Date   • D&C FIRST TRIMESTER / TX INCOMPLETE / MISSED / SEPTIC / INDUCED        Family History   Problem Relation Age of Onset   • Fibroids Biological Mother    • Lupus Biological Mother    • Thyroid disease Biological Father      Current Outpatient Medications   Medication Sig Dispense Refill   • BIKTARVY -25 mg tablet      • metoclopramide (REGLAN) 10 mg tablet Take 1 tablet (10 mg total) by mouth every 6 (six) hours for 10 doses. 15 tablet 0     No current facility-administered medications for this visit.     No Known Allergies      ROS:  The complete review of systems is otherwise negative except as noted in HPI.      PHYSICAL EXAM:  Vitals:    23 0847   BP: 120/78   BP Location: Left upper arm   Patient Position: Sitting   Pulse: 74   Resp: 14   Temp: 36.7 °C (98 °F)   TempSrc: Temporal   SpO2: 98%   Weight: 74.8 kg (164 lb 12.8 oz)   Height: 1.702 m (5' 7\")       Body mass index is 25.81 kg/m².    GENERAL: Well developed, well nourished, in no acute distress  EYES: Extraocular movements intact, conjunctiva no " chemosis, no proptosis, no lid lag, retraction  NECK: Supple, nl anterior cervical lymphadenopathy, no supraclavicular fat pad  THYROID: thyroid palpable, right  distinct nodules palpated, non tender on my exam today, [place] sign negative  CARDIOVASCULAR: S1, S2 heard  RESPIRATORY: Symmetrical chest expansion, normal respiratory effort, clear to auscultation bilaterally  GASTROINTESTINAL: Soft, non tender, wide striae no  MUSCULOSKELETAL: no cyanosis, normal muscle mass, ni edema in lower extremities  SKIN: Warm, dry, no lesions  NEUROLOGIC: Awake, alert, DTR normal, tremors no    Outside records and notes: reviewed. Pertinent positives summarized in HPI    LABS:      -TSH 1.6, T4 total 7.8 normal  No results found for: TSH, B6ASSVN, U5USPAJ, T3FREE, FREET4, D1UZQFVGR, FREET4, T4FREE  No results found for: TSI, TSI, TSI, THYROIDAB, TSI  No results found for: AST, ALT, CBCDIF, VITD25     Chemistry        Component Value Date/Time     (L) 2022    K 3.7 2022     2022    CO2 21 (L) 2022    BUN 8 2022    CREATININE 0.7 2022        Component Value Date/Time    CALCIUM 9.7 2022               IMAGIN2022 thyroid ultrasound  Right lobe 7.2 X2.1X 3.1, left lobe 5.5 X1.7, 1.3  3.5 X3.4X 2.4 right lower pole nodule, solid, hypoechoic, punctate echogenic focus noted, TIRADS 5.  No suspicious lymph nodes are identified adjacent to thyroid gland.  Impression 3.5 cm TI-RADS 5 nodule in the right lobe for which ultrasound-guided aspiration was recommended.    PATHOLOGY:    ASSESSMENT AND PLAN:  This is a 27 y.o. female here for consultation.    1.  Thyroid nodule     -thyroid ultrasound showed right 3.5 cm TR 5 thyroid nodule high suspicion    Recommend to have thyroid ultrasound to review any significant change.    Recommend thyroid FNA after the ultrasound at [place] with Dr. Giordano    Reviewed if she needed any surgery,  it can be performed in second trimester pregnancy    I discussed that thyroid nodules are common in the adult population and have an overall low risk of malignancy.     I discussed the natural progression, prevalence, and recommended evaluation of thyroid nodules. The risk of malignancy in most thyroid nodules is low. Most thyroid cancers are slow growing, although a small percentage can be aggressive. Biopsy recommendations are based on ri.sk factors for thyroid cancer, the size and ultrasonographic appearance of the nodules and the life expectancy and co-morbidities of the patient. Since malignancy can develop in pre-existing nodules, on-going ultrasound surveillance is recommended.     We discussed the ACR TI-RADS scoring system and compared and contrasted with the 2015 American Thyroid Association guidelines.    Reviewed indications for surgery    Check thyroid function test    I have answered all of my patient's questions to the best of my ability. To call us with any changes    Return to office in 3 months or earlier if issues arise     Orders Placed This Encounter   Procedures   • ULTRASOUND THYROID   • TSH 3rd Generation   • T4, free     Cc primary care physician.      This patient has been dictated using speech recognition software. Inadvertent speech recognition errors should be disregarded. Please do not hesitate to call the office for clarification.

## 2023-04-11 NOTE — HPI: CYST
How Severe Is Your Cyst?: moderate
Is This A New Presentation, Or A Follow-Up?: Cyst
Additional History: Patient was seen by primary care doctor for cyst and he told her to go see a dermatologist if the cyst got worse.

## 2023-04-11 NOTE — LETTER
2023     Isela Baca MD  [address]  [address]  [facility] [address]    Patient: Yohana Kaye  YOB: 1995  Date of Visit: 2023      Dear Dr. Baca:    Thank you for referring Yohana Kaye to me for evaluation. Below are my notes for this consultation.    If you have questions, please do not hesitate to call me. I look forward to following your patient along with you.         Sincerely,        Aurelia Chin MD        CC: No Recipients    Aurelia Chin MD  2023  3:23 PM  Signed  Yohana Kaye is a 27 y.o. female who presents today for evaluation and management of thyroid nodule. Referred by Isela Baca MD.       History  ~ 2022 she felt lump in throat.  She was at the visit with no records.  At the end of the visit records were obtained and discussed  thyroid ultrasound  ~ 2022.  Noted right lower pole TR 5 3.5 cm solid nodule and biopsy was recommended    She did not have biopsy yet.  Here for consultation    She is currently 4 weeks gestation. Planning to see gyn.    Moved to [place] 2 weeks ago, came here for medical evaluation.    No prior history of thyroid disease  No compression symptoms (no dysphagia/SOB/pressure symptoms in neck)  Globus sensation    Family history of thyroid disease: dad ? Thyroid disease  Any exposure to XRT head/neck: no         No past medical history on file.  Past Surgical History:   Procedure Laterality Date   • D&C FIRST TRIMESTER / TX INCOMPLETE / MISSED / SEPTIC / INDUCED        Family History   Problem Relation Age of Onset   • Fibroids Biological Mother    • Lupus Biological Mother    • Thyroid disease Biological Father      Current Outpatient Medications   Medication Sig Dispense Refill   • BIKTARVY -25 mg tablet      • metoclopramide (REGLAN) 10 mg tablet Take 1 tablet (10 mg total) by mouth every 6 (six) hours for 10 doses. 15 tablet 0     No current  "facility-administered medications for this visit.     No Known Allergies      ROS:  The complete review of systems is otherwise negative except as noted in HPI.      PHYSICAL EXAM:  Vitals:    23 0847   BP: 120/78   BP Location: Left upper arm   Patient Position: Sitting   Pulse: 74   Resp: 14   Temp: 36.7 °C (98 °F)   TempSrc: Temporal   SpO2: 98%   Weight: 74.8 kg (164 lb 12.8 oz)   Height: 1.702 m (5' 7\")       Body mass index is 25.81 kg/m².    GENERAL: Well developed, well nourished, in no acute distress  EYES: Extraocular movements intact, conjunctiva no chemosis, no proptosis, no lid lag, retraction  NECK: Supple, nl anterior cervical lymphadenopathy, no supraclavicular fat pad  THYROID: thyroid palpable, right  distinct nodules palpated, non tender on my exam today, [place] sign negative  CARDIOVASCULAR: S1, S2 heard  RESPIRATORY: Symmetrical chest expansion, normal respiratory effort, clear to auscultation bilaterally  GASTROINTESTINAL: Soft, non tender, wide striae no  MUSCULOSKELETAL: no cyanosis, normal muscle mass, ni edema in lower extremities  SKIN: Warm, dry, no lesions  NEUROLOGIC: Awake, alert, DTR normal, tremors no    Outside records and notes: reviewed. Pertinent positives summarized in HPI    LABS:      -TSH 1.6, T4 total 7.8 normal  No results found for: TSH, Y2FUWGF, Z0VWZII, T3FREE, FREET4, O7UKGPICP, FREET4, T4FREE  No results found for: TSI, TSI, TSI, THYROIDAB, TSI  No results found for: AST, ALT, CBCDIF, VITD25     Chemistry        Component Value Date/Time     (L) 2022    K 3.7 2022     2022    CO2 21 (L) 2022    BUN 8 2022    CREATININE 0.7 2022        Component Value Date/Time    CALCIUM 9.7 2022               IMAGIN2022 thyroid ultrasound  Right lobe 7.2 X2.1X 3.1, left lobe 5.5 X1.7, 1.3  3.5 X3.4X 2.4 right lower pole nodule, solid, hypoechoic, punctate echogenic focus " noted, TIRADS 5.  No suspicious lymph nodes are identified adjacent to thyroid gland.  Impression 3.5 cm TI-RADS 5 nodule in the right lobe for which ultrasound-guided aspiration was recommended.    PATHOLOGY:    ASSESSMENT AND PLAN:  This is a 27 y.o. female here for consultation.    1.  Thyroid nodule    4/22 -thyroid ultrasound showed right 3.5 cm TR 5 thyroid nodule high suspicion    Recommend to have thyroid ultrasound to review any significant change.    Recommend thyroid FNA after the ultrasound at [place] with Dr. Giordano    Reviewed if she needed any surgery, it can be performed in second trimester pregnancy    I discussed that thyroid nodules are common in the adult population and have an overall low risk of malignancy.     I discussed the natural progression, prevalence, and recommended evaluation of thyroid nodules. The risk of malignancy in most thyroid nodules is low. Most thyroid cancers are slow growing, although a small percentage can be aggressive. Biopsy recommendations are based on ri.sk factors for thyroid cancer, the size and ultrasonographic appearance of the nodules and the life expectancy and co-morbidities of the patient. Since malignancy can develop in pre-existing nodules, on-going ultrasound surveillance is recommended.     We discussed the ACR TI-RADS scoring system and compared and contrasted with the 2015 American Thyroid Association guidelines.    Reviewed indications for surgery    Check thyroid function test    I have answered all of my patient's questions to the best of my ability. To call us with any changes    Return to office in 3 months or earlier if issues arise     Orders Placed This Encounter   Procedures   • ULTRASOUND THYROID   • TSH 3rd Generation   • T4, free     Cc primary care physician.      This patient has been dictated using speech recognition software. Inadvertent speech recognition errors should be disregarded. Please do not hesitate to call the office for  clarification.

## 2024-06-17 ENCOUNTER — TELEPHONE (OUTPATIENT)
Dept: ENDOCRINOLOGY | Facility: CLINIC | Age: 29
End: 2024-06-17
Payer: COMMERCIAL

## 2024-06-17 ENCOUNTER — TELEPHONE (OUTPATIENT)
Dept: ENDOCRINOLOGY | Facility: CLINIC | Age: 29
End: 2024-06-17

## 2024-06-17 DIAGNOSIS — E04.1 THYROID NODULE: Primary | ICD-10-CM

## 2024-06-17 NOTE — TELEPHONE ENCOUNTER
[facility] called to have biopsy order faxed along with any recent thyroid lab results and ultrasounds Fax #[phone]. Please give them a call if you have any questions [phone].

## 2024-06-25 NOTE — TELEPHONE ENCOUNTER
[facility] called and said they did not receive biosy order, ultrasound on thyroid report, and blood work for [facility] and would like it to be faxed to [phone].

## 2024-06-26 NOTE — TELEPHONE ENCOUNTER
Good morning Yohana!    I tried calling your phone but it said calls were not being accepted at this time. Please call our office to schedule a follow up with Dr. Chin and we can get your labs and tests ordered. Talk to you soon! Our phone number is [phone] option 3.    Priti

## 2024-06-27 NOTE — TELEPHONE ENCOUNTER
Tried calling pt twice but phone says she's not accepting calls at this time. Also sent a portal message to schedule an appointment with Dr. Chin.

## 2024-07-08 ENCOUNTER — TELEPHONE (OUTPATIENT)
Dept: ENDOCRINOLOGY | Facility: CLINIC | Age: 29
End: 2024-07-08
Payer: COMMERCIAL

## 2024-07-08 NOTE — TELEPHONE ENCOUNTER
[facility] called to request order/script for thyroid biopsy, previous blood work that show TSH, and any US thyroid reports that patient had. Please fax information to  [phone]

## 2025-05-07 ENCOUNTER — HOSPITAL ENCOUNTER (OUTPATIENT)
Dept: RADIOLOGY | Facility: CLINIC | Age: 30
Discharge: HOME | End: 2025-05-07
Attending: INTERNAL MEDICINE
Payer: COMMERCIAL

## 2025-05-07 DIAGNOSIS — E04.1 THYROID NODULE: ICD-10-CM

## 2025-05-07 PROCEDURE — 76536 US EXAM OF HEAD AND NECK: CPT

## 2025-05-08 ENCOUNTER — RESULTS FOLLOW-UP (OUTPATIENT)
Dept: ENDOCRINOLOGY | Facility: CLINIC | Age: 30
End: 2025-05-08

## 2025-05-12 ENCOUNTER — TELEPHONE (OUTPATIENT)
Dept: ENDOCRINOLOGY | Facility: CLINIC | Age: 30
End: 2025-05-12
Payer: COMMERCIAL

## 2025-05-14 ENCOUNTER — TELEMEDICINE (OUTPATIENT)
Dept: ENDOCRINOLOGY | Facility: CLINIC | Age: 30
End: 2025-05-14
Payer: COMMERCIAL

## 2025-05-14 DIAGNOSIS — E04.1 THYROID NODULE: Primary | ICD-10-CM

## 2025-05-14 PROCEDURE — 99215 OFFICE O/P EST HI 40 MIN: CPT | Mod: GT | Performed by: INTERNAL MEDICINE

## 2025-05-14 RX ORDER — NORGESTIMATE AND ETHINYL ESTRADIOL 0.25-0.035
1 KIT ORAL DAILY
COMMUNITY
Start: 2025-03-14

## 2025-05-14 NOTE — H&P (VIEW-ONLY)
Verification of Patient Location:  The patient affirms they are currently located in the following state: [place]    Are you in your home or a private residence? Yes    Request for Consent:    Audio and Video Encounter   Eva, my name is Aurelia Chin MD.  Before we proceed, can you please verify your identification by telling me your full name and date of birth?  Can you tell me who is in the room with you?    You and I are about to have a telemedicine check-in or visit because you have requested it.  This is a live video-conference.  I am a real person, speaking to you in real time.  There is no one else with me on the video-conference. I am not recording this conversation and I am asking you not to record it.  This telemedicine visit will be billed to your health insurance or you, if you are self-insured.  You understand you will be responsible for any copayments or coinsurances that apply to your telemedicine visit.  Communication platform used for this encounter:  Bridgestream Video Visit (Epic Video Client)       Before starting our telemedicine visit, I am required to get your consent for this virtual check-in or visit by telemedicine. Do you consent?    Patient Response to Request for Consent:  Yes      Visit Documentation:  Lázaro Kaye is a 30 y.o. female who presents today for evaluation and management of thyroid nodule. Referred by Isela Baca MD.       05/14/25  Last seen April 2023.  She has not followed up since.  Did not have follow-up labs, thyroid ultrasound as recommended at the visit or thyroid biopsy.    She had pregnancy, delivered 2024  3/25 - 3.1 cm solid lesion in the right cheek region correlating with the patient's palpable abnormality in this location. This could represent an enlarged lymph node, however, the appearance is nonspecific.   She saw plastic surgery at [place] and reports was told  cyst and scheduled to have surgery mid April  May 2025 she  had thyroid ultrasound and here to discuss  She denied compression symptoms, dysphagia      History  ~ 2022 she felt lump in throat.  She was at the visit with no records.  At the end of the visit records were obtained and discussed  thyroid ultrasound  ~ 2022.  Noted right lower pole TR 5 3.5 cm solid nodule and biopsy was recommended    She did not have biopsy yet.  Here for consultation    She is currently 4 weeks gestation. Planning to see gyn.    Moved to [place] 2 weeks ago, came here for medical evaluation.    No prior history of thyroid disease  No compression symptoms (no dysphagia/SOB/pressure symptoms in neck)  Globus sensation    Family history of thyroid disease: dad ? Thyroid disease  Any exposure to XRT head/neck: no         History reviewed. No pertinent past medical history.  Past Surgical History   Procedure Laterality Date    D&c first trimester / tx incomplete / missed / septic / induced        Family History   Problem Relation Name Age of Onset    Fibroids Biological Mother      Lupus Biological Mother      Thyroid disease Biological Father       Current Outpatient Medications   Medication Sig Dispense Refill    SPRINTEC, 28, 0.25-35 mg-mcg per tablet Take 1 tablet by mouth daily.       No current facility-administered medications for this visit.     No Known Allergies      ROS:  The complete review of systems is otherwise negative except as noted in HPI.      PHYSICAL EXAM:  There were no vitals filed for this visit.      There is no height or weight on file to calculate BMI.    Could not be obtained virtual visit     GENERAL: Well developed, well nourished, in no acute distress  EYES: Extraocular movements intact, conjunctiva no chemosis  NECK: Thyroid nodule visible, right cheek mass visible, [place] sign negative  RESPIRATORY: Symmetrical chest expansion, normal respiratory effort  NEUROLOGIC: Awake, alert     Outside records and notes: reviewed. Pertinent positives  "summarized in HPI    LABS:     4/22 -TSH 1.6, T4 total 7.8 normal    No results found for: \"TSH\", \"G7TFCYJ\", \"Q3YHTQI\", \"T3FREE\", \"FREET4\", \"J8GBCNWJQ\", \"T4FREE\"  No results found for: \"TSI\", \"THYROIDAB\"  No results found for: \"AST\", \"ALT\", \"CBCDIF\", \"VITD25\"     Chemistry        Component Value Date/Time     (L) 11/06/2022 1902    K 3.7 11/06/2022 1902     11/06/2022 1902    CO2 21 (L) 11/06/2022 1902    BUN 8 11/06/2022 1902    CREATININE 0.7 11/06/2022 1902        Component Value Date/Time    CALCIUM 9.7 11/06/2022 1902               IMAGING:   Results for orders placed during the hospital encounter of 05/07/25    ULTRASOUND THYROID [[id number]]    Narrative  EXAM: US THYROID    CLINICAL HISTORY: E04.1: Nontoxic single thyroid nodule    COMPARISON: None    PROCEDURE: A complete ultrasound examination of the thyroid and lateral cervical  lymph node chains was done with grayscale and color flow Doppler imaging.    COMMENT:    THYROID  Right lobe: 1.9 cm x 3.7 cm x 7.9 cm  Left lobe: 1.4 cm x 1.4 cm x 6.4 cm  Isthmus: 0.8 cm  Echotexture: Normal in echotexture.    NODULES:  *  Right nodule 1  *  Location: Mid to lower  *  Measurement:  4.1 cm x  6.1 cm x 5.8 cm  *  Prior measurement: No prior exam for comparison  *  Composition: Mixed cystic and solid  (1 point), possibly spongiform  *  Echogenicity: Isoechoic (1 point)  *  Shape: Wider-than-tall (0 points)  *  Margin: Smooth (0 points)  *  Echogenic foci: Macrocalcifications (1 point). Comet-tail artifacts.  *  TI-RADS category: TR3 (3 points)    *  Left nodule 1  *  Location: Lower pole  *  Measurement: 0.4 cm x 0.6 cm x 0.4 cm  *  Prior measurement: No prior exam for comparison  *  Composition: Mixed cystic and solid  (1 point)  *  Echogenicity: Isoechoic (1 point)  *  Shape: Wider-than-tall (0 points)  *  Margin: Smooth (0 points)  *  Echogenic foci: None or comet-tail artifacts (0 points)  *  TI-RADS category: TR2 (2 points)      LYMPH NODES: Survey of " the bilateral lateral cervical lymph node chains revealed  no suspicious appearing lymph nodes.    ACR TI-RADS recommendations (for nodules not previously biopsied)  TR5 (>/=7 points) - FNA if >/= 1cm, follow-up if 0.5 - 0.9 cm every year for 5  years  TR4 (4-6 points) - FNA if >/= 1.5cm, follow-up if 1 - 1.4 cm in 1, 2, 3 and 5  years  TR3 (3 points)- FNA if >/= 2.5cm, follow-up if 1.5 - 2.4 cm in 1, 3 and 5 years  TR2 (2 points) and TR1 (0 points) - No FNA or follow-up  ACR TI-RADS recommends that no more than two nodules with the highest ACR  TI-RADS total point should be biopsied and no more than four nodules should be  followed.    General considerations for ACR TI-RADS: Please note: TI-RADS recommendations are  for low risk patients. If there are patient related factors, i.e. positive  PET/CT, personal history of head and neck radiation as a child, or other risk  factors for papillary thyroid carcinoma, the recommendations may not apply to  that specific patient.  Keikoskarina FN et al., J Am Milind Radiology 2017;14:587-595.    Impression  IMPRESSION:  6.1 cm TR3 nodule in the right lobe of the thyroid. FNA is recommended.    0.6 cm TR2 nodule in the left lobe of the thyroid. No dedicated follow-up  imaging is recommended.    I reported these findings and recommendations to NATHANAEL PICKETT on 5/7/2025  5:34 PM through [employer] Secure Chat.    In accordance with [place] Act 112,  the patient will receive a letter notifying them  to follow up with their physician.         4/18/2022 thyroid ultrasound  Right lobe 7.2 X2.1X 3.1, left lobe 5.5 X1.7, 1.3  3.5 X3.4X 2.4 right lower pole nodule, solid, hypoechoic, punctate echogenic focus noted, TIRADS 5.  No suspicious lymph nodes are identified adjacent to thyroid gland.  Impression 3.5 cm TI-RADS 5 nodule in the right lobe for which ultrasound-guided aspiration was recommended.    PATHOLOGY:    ASSESSMENT AND PLAN:  This is a 30 y.o. female here for consultation.    1.   Multiple thyroid nodules    4/22 -thyroid ultrasound showed right 3.5 cm TR 5 thyroid nodule high suspicion    5/25 -I personally reviewed thyroid ultrasound images with patient, note ~6 cm dominant nodule in the right thyroid, appeared majority solid with echogenic focus.  Left subcentimeter mixed with nodule.  Recommend biopsy of the large right nodule.    Reviewed extensively indications for biopsy are concern of malignancy and large size of the nodule.  Explained in detail she has right cheek 3.1 cm mass, described as possible lymph node, concerned if metastatic PTC.  Recommend biopsy of the cheek lesion also.  Explained to her that based on the ultrasound report and the note from plastic surgeon in [facility] there is no report of being a cystic lesion therefore would recommend biopsy to rule out metastatic lesion.  Explained biopsy is needed prior to planning surgery so it will help the surgeon to plan the extent of surgery needed.  She voiced understanding.  She will discuss with the surgeon at [place] and will get back to me if willing to proceed with biopsy of the cheek lesion in addition to the thyroid nodule.    Advised to have thyroid test if normal she will proceed with biopsy.  She reported will go for the blood test tomorrow  We will place orders for thyroid nodule biopsy after she gets back to me    Discussed total thyroidectomy /lobectomy.  Patients undergoing thyroid lobectomy typically have a 15-20% chance of becoming hypothyroid and needing hormone replacement.      Stressed and counseled to have timely follow-up and investigations to monitor closely and to avoid delay in care and also to monitor for side effects of medications.    I also reviewed possible cytology outcomes (Bethasa classification) and their significance and Afirma study if indeterminate.     I spent 43 minutes on this day of service with patient  or patient proxy  performing the following activities; reviewing thyroid  ultrasound images with patient, interpreting, discussing concern of malignancy, nodule, FNA results, indications for surgery, obtaining history, performing examination, entering orders, documenting, preparing for visit, obtaining/reviewing records, providing counseling and education and coordinating care.      I have answered all of my patient's questions to the best of my ability. To call us with any changes    Return to office in 3 months or earlier if issues arise     Orders Placed This Encounter   Procedures    TSH 3rd Generation    T4, free    Vitamin D 25 hydroxy     Cc primary care physician.      This patient has been dictated using speech recognition software. Inadvertent speech recognition errors should be disregarded. Please do not hesitate to call the office for clarification.

## 2025-05-16 ENCOUNTER — RESULTS FOLLOW-UP (OUTPATIENT)
Dept: ENDOCRINOLOGY | Facility: CLINIC | Age: 30
End: 2025-05-16

## 2025-05-16 DIAGNOSIS — E04.1 THYROID NODULE: Primary | ICD-10-CM

## 2025-05-16 LAB — 25(OH)D3+25(OH)D2 SERPL-MCNC: 14.4 NG/ML (ref 30–100)

## 2025-05-27 ENCOUNTER — TELEPHONE (OUTPATIENT)
Dept: RADIOLOGY | Facility: HOSPITAL | Age: 30
End: 2025-05-27
Payer: COMMERCIAL

## 2025-05-28 ENCOUNTER — HOSPITAL ENCOUNTER (OUTPATIENT)
Dept: RADIOLOGY | Facility: HOSPITAL | Age: 30
Discharge: HOME | End: 2025-05-28
Attending: INTERNAL MEDICINE | Admitting: RADIOLOGY
Payer: COMMERCIAL

## 2025-05-28 ENCOUNTER — APPOINTMENT (OUTPATIENT)
Dept: RADIOLOGY | Facility: HOSPITAL | Age: 30
End: 2025-05-28
Attending: INTERNAL MEDICINE
Payer: COMMERCIAL

## 2025-05-28 VITALS
DIASTOLIC BLOOD PRESSURE: 90 MMHG | SYSTOLIC BLOOD PRESSURE: 143 MMHG | OXYGEN SATURATION: 98 % | RESPIRATION RATE: 20 BRPM | HEART RATE: 70 BPM

## 2025-05-28 DIAGNOSIS — E04.1 THYROID NODULE: ICD-10-CM

## 2025-05-28 PROCEDURE — 0G9H3ZX DRAINAGE OF RIGHT THYROID GLAND LOBE, PERCUTANEOUS APPROACH, DIAGNOSTIC: ICD-10-PCS | Performed by: RADIOLOGY

## 2025-05-28 PROCEDURE — 76942 ECHO GUIDE FOR BIOPSY: CPT

## 2025-05-28 PROCEDURE — 88173 CYTOPATH EVAL FNA REPORT: CPT | Performed by: INTERNAL MEDICINE

## 2025-05-28 PROCEDURE — 25000000 HC PHARMACY GENERAL: Performed by: RADIOLOGY

## 2025-05-28 RX ORDER — LIDOCAINE HYDROCHLORIDE 10 MG/ML
INJECTION, SOLUTION INFILTRATION; PERINEURAL
Status: COMPLETED | OUTPATIENT
Start: 2025-05-28 | End: 2025-05-28

## 2025-05-28 RX ADMIN — LIDOCAINE HYDROCHLORIDE 3 ML: 10 INJECTION, SOLUTION INFILTRATION; PERINEURAL at 13:24

## 2025-05-28 NOTE — OR SURGEON
Pre-Procedure patient identification:  I am the primary operating surgeon/proceduralist and I have reviewed the applicable pathology reports and radiology studies for this procedure. I have identified the patient and confirmed laterality is right on 05/28/25 at 1:33 PM Eduardo Cowan MD

## 2025-05-28 NOTE — Clinical Note
Patient placed on procedure table in supine position with arms at side. Positioning devices: side rails up x 2.

## 2025-05-28 NOTE — POST-PROCEDURE NOTE
Interventional Radiology Brief Postprocedure Note    Yohana Kaye    Attending: Chapo    Diagnosis: Right thyroid nodule.  Right cheek mass    Description of procedure: US guided FNA    Contrast: None     Anesthesia:  Local (Lidocaine)    Volume of Lidocaine Utilized (ml): 3     Medications: None     Complications: None    Estimated Blood Loss: 0-10 ml    Anticoagulation: N/a    Specimens: Right thyroid nodule. Right cheek mass    Findings: US guided FNA of Right thyroid nodule and right cheek mass    Komal Cowan MD

## 2025-05-30 ENCOUNTER — TELEPHONE (OUTPATIENT)
Facility: HOSPITAL | Age: 30
End: 2025-05-30
Payer: COMMERCIAL

## 2025-05-30 ENCOUNTER — TELEMEDICINE (OUTPATIENT)
Dept: ENDOCRINOLOGY | Facility: CLINIC | Age: 30
End: 2025-05-30
Payer: COMMERCIAL

## 2025-05-30 DIAGNOSIS — E55.9 VITAMIN D DEFICIENCY: ICD-10-CM

## 2025-05-30 DIAGNOSIS — E04.1 THYROID NODULE: Primary | ICD-10-CM

## 2025-05-30 DIAGNOSIS — C73 THYROID CANCER (CMS/HCC): ICD-10-CM

## 2025-05-30 LAB
CASE RPRT: NORMAL
CLINICAL INFO: NORMAL
PATH REPORT.FINAL DX SPEC: NORMAL
PATH REPORT.FINAL DX SPEC: NORMAL
PATH REPORT.GROSS SPEC: NORMAL

## 2025-05-30 PROCEDURE — 99215 OFFICE O/P EST HI 40 MIN: CPT | Mod: GT | Performed by: INTERNAL MEDICINE

## 2025-05-30 RX ORDER — CHOLECALCIFEROL (VITAMIN D3) 1250 MCG
50000 TABLET ORAL WEEKLY
Qty: 12 TABLET | Refills: 0 | Status: SHIPPED | OUTPATIENT
Start: 2025-05-30

## 2025-06-19 ENCOUNTER — OFFICE VISIT (OUTPATIENT)
Dept: OTOLARYNGOLOGY | Facility: CLINIC | Age: 30
End: 2025-06-19
Payer: COMMERCIAL

## 2025-06-19 VITALS
HEIGHT: 67 IN | OXYGEN SATURATION: 99 % | DIASTOLIC BLOOD PRESSURE: 78 MMHG | BODY MASS INDEX: 25.9 KG/M2 | HEART RATE: 74 BPM | SYSTOLIC BLOOD PRESSURE: 130 MMHG | WEIGHT: 165 LBS

## 2025-06-19 DIAGNOSIS — R09.82 POST-NASAL DRIP: ICD-10-CM

## 2025-06-19 DIAGNOSIS — K11.8 MASS OF RIGHT PAROTID GLAND: ICD-10-CM

## 2025-06-19 DIAGNOSIS — J34.3 HYPERTROPHY OF NASAL TURBINATES: ICD-10-CM

## 2025-06-19 DIAGNOSIS — J31.0 CHRONIC RHINITIS: ICD-10-CM

## 2025-06-19 DIAGNOSIS — C73 THYROID CANCER (CMS/HCC): Primary | ICD-10-CM

## 2025-06-19 PROCEDURE — 31575 DIAGNOSTIC LARYNGOSCOPY: CPT | Performed by: OTOLARYNGOLOGY

## 2025-06-19 PROCEDURE — 3008F BODY MASS INDEX DOCD: CPT | Performed by: OTOLARYNGOLOGY

## 2025-06-19 PROCEDURE — 99205 OFFICE O/P NEW HI 60 MIN: CPT | Mod: 25 | Performed by: OTOLARYNGOLOGY

## 2025-06-19 RX ORDER — AZELASTINE 1 MG/ML
2 SPRAY, METERED NASAL NIGHTLY
Qty: 180 ML | Refills: 3 | Status: SHIPPED | OUTPATIENT
Start: 2025-06-19 | End: 2026-06-19

## 2025-06-19 NOTE — LETTER
June 19, 2025     Isela Baca MD  [address] [facility]  [address]  [facility] [address]    Patient: Yohana Kaye  YOB: 1995  Date of Visit: 6/19/2025      Dear Dr. Baca:    Thank you for referring Yohana Kaye to me for evaluation. Below are my notes for this consultation.     [facility] Otolaryngology - Head and Neck Surgery is a full-service department that offers care for patients with all otolaryngology ailments. We diagnose and treat conditions of the ears, nose, and throat, and have a comprehensive head and neck cancer service line. The head and neck cancer service line includes surgical, radiation, and medical oncology capabilities, with close collaboration through a multidisciplinary conference (MDC). The MDC is best-in-class, with nurse navigators, speech, nutrition, and speech therapy in attendance.     With extensive experience in the diagnosis, treatment, and surgical management of head and neck cancers, I have dedicated my career to advancing the care and outcomes for patients facing these challenging conditions. I offer treatment for salivary gland tumors, thyroid lesions, neck masses, oral cavity, laryngeal, and oropharyngeal lesions. I am excited to announce that we have just launched the new Transoral Robotic Surgery (TORS) program for oropharyngeal lesions.    If you have questions, please do not hesitate to call me directly or message me on Epic. Elina Post, our , and Brittany Arriola, our nurse navigator, may also be reached to help schedule patients on an urgent basis. I look forward to treating your patient along with you.    Respectfully,        Zane Nova DO, MS, MBS, FACS  Otolaryngologist - Head and Neck Surgeon  Head and Neck Surgical Oncologist          CC: Zane Lopez DO  6/19/2025 10:25 AM  Sign when Signing Visit          Otolaryngology - Head and Neck Surgery  [address] [facility], [address]  [place] [address]  Phone: [phone]  Fax: [phone]      Patient ID: Yohana Kaye                                  : 1995    Visit Date: 2025    Referring Provider: Isela Baca MD    Reason for Consultation/Chief Complaint  Chief Complaint   Patient presents with   • Pre-op Visit     History of Present Illness  Yohana Kaye is a 30 y.o. female who presents with biopsy-proven right sided papillary thyroid carcinoma.  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  She had these biopsies performed on May 28, 2025.  She saw her primary care physician and dermatologist who referred her to [place] [facility] for the parotid surgery.  She saw the plastic surgeon who told her that she does not do parotid surgery.  She believes that she has had the thyroid nodule for at least 2 years.  She believes that the right parotid mass started around the same time.  She does not have any difficulty swallowing, difficulty voicing, pain or any other symptoms.    Review of Systems  Fourteen point ROS is otherwise negative except for those detailed above in the History of Present Illness.    Current Medications  Current Outpatient Medications   Medication Sig Dispense Refill   • cholecalciferol (VITAMIN D3) 1,250 mcg (50,000 unit) tablet Take 1 tablet (50,000 Units total) by mouth once a week. 12 tablet 0   • SPRINTEC, 28, 0.25-35 mg-mcg per tablet Take 1 tablet by mouth daily.       No current facility-administered medications for this visit.       Past Medical History  No past medical history on file.    I reviewed the Past Medical History and I did not make any changes or additions.    Past Surgical History  Past Surgical History   Procedure Laterality Date   • D&c first trimester / tx incomplete / missed / septic / induced          I reviewed the Past Surgical History and I did not make any changes or additions.    Social History  Social History     Tobacco  "Use   • Smoking status: Former     Types: Electronic Cigarette     Passive exposure: Past   • Smokeless tobacco: Never   Vaping Use   • Vaping status: Never Used   Substance Use Topics   • Alcohol use: Never   • Drug use: Not Currently     Types: Marijuana       I reviewed the Social History and I did not make any changes or additions.    Family History  Family History   Problem Relation Name Age of Onset   • Fibroids Biological Mother     • Lupus Biological Mother     • Thyroid disease Biological Father         I reviewed the Family History and I did not make any changes or additions.    Allergies  Patient has no known allergies.    I reviewed the Allergies and I did not make any changes or additions.    Physical Exam  Vitals: Visit Vitals  /78 (BP Location: Left upper arm, Patient Position: Sitting)   Pulse 74   Ht 1.702 m (5' 7\")   Wt 74.8 kg (165 lb)   SpO2 99%   BMI 25.84 kg/m²     General:  Well-developed, well-nourished 30 y.o. female in no acute distress  Voice: Normal without hoarseness, breathiness or stridor.  Head: normocephalic, atraumatic  Face: No scars or lesions. Normal symmetry; facial nerve is grossly intact.  There is a 1.5 to 2 cm right superficial parotid mass.  Eyes:  Extraocular muscles appear grossly intact without signs of conjunctival hemorrhage; sclerae are white  Ears: External ear is normal bilaterally.  External auditory normal without stenosis or exostosis. No cerumen. Tympanic membranes clear, mobile and without retraction or scar.  No fluid in the middle ear space. No signs of middle ear infection.  Nose: No obvious external nasal deformity is appreciated  Oral Cavity/oropharynx:  Normal tongue movement. Normal gag reflex. No mucosal lesions or masses, leukoplakia, erythroplakia, ulcers or other abnormalities of the tongue, floor of mouth, buccal mucosa, palate or posterior pharyngeal wall are noted.  Neck: Significant anterior neck fullness consistent with thyroid " goiter.  Cranial Nerves II through XII: Grossly intact  Lungs: Non-labored breathing    Pertinent radiology and labs reviewed    ULTRASOUND GUIDED THYROID BIOPSY, US TECHNICAL ASSISTANCE, ULTRASOUND GUIDED NEEDLE PLACEMENT  Narrative: CLINICAL HISTORY:    Right thyroid nodule and right cheek mass.  Impression: IMPRESSION:   Satisfactory fine-needle aspiration of right thyroid nodule and  right cheek mass.    COMMENT:  PROCEDURE:  Ultrasound guided fine-needle aspiration x2.  ANESTHESIA:  Lidocaine 1% local.  CONTRAST:  None.  MEDICATIONS: None.  DESCRIPTION OF PROCEDURE:    Written informed consent was obtained. Ultrasound  demonstrates a 6 cm right thyroid nodule and a 3.2 cm palpable right cheek mass.  The area was prepped and draped in the usual sterile manner. Using real-time  ultrasound guidance, fine needle aspiration of the right thyroid nodule was  performed with 2 separate 25-gauge needles and 2 separate 22-gauge needles.  Samples for submitted in CytoLyt and Afirma solution. Post biopsy ultrasound  demonstrates no immediate hemorrhage. The area was prepped and draped in the  usual sterile manner. Using real-time ultrasound guidance, fine needle  aspiration of the right cheek mass was performed with 2 separate 25-gauge  needles and 2 separate 22-gauge needles. Samples for submitted in CytoLyt and  RPMI solution for cytology and flow cytometry. Post biopsy ultrasound  demonstrates no immediate hemorrhage. Band-Aid were applied. The patient  tolerated the procedure without immediate complication and left the department  in stable condition.     Flexible laryngoscopy   (Indication: Need for more detailed anatomic exam. Verbal consent was obtained from the patient prior to the procedure.)    A flexible fiberoptic laryngoscope was passed into the right nostril. The right inferior turbinate, right middle turbinate, uncinate process, and septum were observed. The scope was then passed into the choanae and  nasopharynx.  The scope was then turned in a caudad direction to examine the posterior pharyngeal wall, posterior aspect of the tonsils and tonsillar pillars, base of tongue, and epiglottis. The scope was then advanced to reveal the false vocal cords, true vocal cords, and subglottis. Lastly, the pyriform sinuses were observed.     Exam findings demonstrate normal vocal cord movement but there is significant postnasal drip, moderate congestion, inferior turbinate hypertrophy and deviated nasal septum.      Assessment and Plan  Yohana Kaye is a 30 y.o. female with biopsy-proven papillary thyroid carcinoma and right pleomorphic adenoma.  I reviewed total thyroidectomy and right superficial parotidectomy in detail and the patient would like to proceed.  In particular, I reviewed the risk of recurrent laryngeal nerve injury which may be transient versus permanent.  I also reviewed the risk of facial nerve injury which may also be transient versus permanent.  I explained to her that she will need a drain in her anterior neck and also in her parotid region. She will need Synthroid after surgery and regular surveillance by her endocrinologist. There is a possibility of needing radioactive iodine afterwards as well.  She will need to spend at least 1-2 nights in the hospital after surgery.  All questions and concerns were answered to the patient's satisfaction.    Thank you for the referral and the opportunity to participate in this patient's care.    Sincerely,      Zane Nova DO, MS, MBS, FACS  Otolaryngologist - Head & Neck Surgeon  Head and Neck Surgical Oncologist

## 2025-06-19 NOTE — H&P (VIEW-ONLY)
Otolaryngology - Head and Neck Surgery  [address] [facility], [address]  [place] [address]  Phone: [phone]  Fax: [phone]      Patient ID: Yohana Kaye                                  : 1995    Visit Date: 2025    Referring Provider: Isela Baca MD    Reason for Consultation/Chief Complaint  Chief Complaint   Patient presents with    Pre-op Visit     History of Present Illness  Yohana Kaye is a 30 y.o. female who presents with biopsy-proven right sided papillary thyroid carcinoma.  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  She had these biopsies performed on May 28, 2025.  She saw her primary care physician and dermatologist who referred her to [place] [facility] for the parotid surgery.  She saw the plastic surgeon who told her that he/she does not do parotid surgery.  She believes that she has had the thyroid nodule for at least 2 years.  She believes that the right parotid mass started around the same time.  She does not have any difficulty swallowing, difficulty voicing, pain or any other symptoms.    Review of Systems  Fourteen point ROS is otherwise negative except for those detailed above in the History of Present Illness.    Current Medications  Current Outpatient Medications   Medication Sig Dispense Refill    cholecalciferol (VITAMIN D3) 1,250 mcg (50,000 unit) tablet Take 1 tablet (50,000 Units total) by mouth once a week. 12 tablet 0    SPRINTEC, 28, 0.25-35 mg-mcg per tablet Take 1 tablet by mouth daily.       No current facility-administered medications for this visit.       Past Medical History  No past medical history on file.    I reviewed the Past Medical History and I did not make any changes or additions.    Past Surgical History  Past Surgical History   Procedure Laterality Date    D&c first trimester / tx incomplete / missed / septic / induced          I reviewed the Past Surgical  "History and I did not make any changes or additions.    Social History  Social History     Tobacco Use    Smoking status: Former     Types: Electronic Cigarette     Passive exposure: Past    Smokeless tobacco: Never   Vaping Use    Vaping status: Never Used   Substance Use Topics    Alcohol use: Never    Drug use: Not Currently     Types: Marijuana       I reviewed the Social History and I did not make any changes or additions.    Family History  Family History   Problem Relation Name Age of Onset    Fibroids Biological Mother      Lupus Biological Mother      Thyroid disease Biological Father         I reviewed the Family History and I did not make any changes or additions.    Allergies  Patient has no known allergies.    I reviewed the Allergies and I did not make any changes or additions.    Physical Exam  Vitals: Visit Vitals  /78 (BP Location: Left upper arm, Patient Position: Sitting)   Pulse 74   Ht 1.702 m (5' 7\")   Wt 74.8 kg (165 lb)   SpO2 99%   BMI 25.84 kg/m²     General:  Well-developed, well-nourished 30 y.o. female in no acute distress  Voice: Normal without hoarseness, breathiness or stridor.  Head: normocephalic, atraumatic  Face: No scars or lesions. Normal symmetry; facial nerve is grossly intact.  There is a 1.5 to 2 cm right superficial parotid mass.  Eyes:  Extraocular muscles appear grossly intact without signs of conjunctival hemorrhage; sclerae are white  Ears: External ear is normal bilaterally.  External auditory normal without stenosis or exostosis. No cerumen. Tympanic membranes clear, mobile and without retraction or scar.  No fluid in the middle ear space. No signs of middle ear infection.  Nose: No obvious external nasal deformity is appreciated  Oral Cavity/oropharynx:  Normal tongue movement. Normal gag reflex. No mucosal lesions or masses, leukoplakia, erythroplakia, ulcers or other abnormalities of the tongue, floor of mouth, buccal mucosa, palate or posterior pharyngeal " wall are noted.  Neck: Significant anterior neck fullness consistent with thyroid goiter.  Cranial Nerves II through XII: Grossly intact  Lungs: Non-labored breathing    Pertinent radiology and labs reviewed    ULTRASOUND GUIDED THYROID BIOPSY, US TECHNICAL ASSISTANCE, ULTRASOUND GUIDED NEEDLE PLACEMENT  Narrative: CLINICAL HISTORY:    Right thyroid nodule and right cheek mass.  Impression: IMPRESSION:   Satisfactory fine-needle aspiration of right thyroid nodule and  right cheek mass.    COMMENT:  PROCEDURE:  Ultrasound guided fine-needle aspiration x2.  ANESTHESIA:  Lidocaine 1% local.  CONTRAST:  None.  MEDICATIONS: None.  DESCRIPTION OF PROCEDURE:    Written informed consent was obtained. Ultrasound  demonstrates a 6 cm right thyroid nodule and a 3.2 cm palpable right cheek mass.  The area was prepped and draped in the usual sterile manner. Using real-time  ultrasound guidance, fine needle aspiration of the right thyroid nodule was  performed with 2 separate 25-gauge needles and 2 separate 22-gauge needles.  Samples for submitted in CytoLyt and Afirma solution. Post biopsy ultrasound  demonstrates no immediate hemorrhage. The area was prepped and draped in the  usual sterile manner. Using real-time ultrasound guidance, fine needle  aspiration of the right cheek mass was performed with 2 separate 25-gauge  needles and 2 separate 22-gauge needles. Samples for submitted in CytoLyt and  RPMI solution for cytology and flow cytometry. Post biopsy ultrasound  demonstrates no immediate hemorrhage. Band-Aid were applied. The patient  tolerated the procedure without immediate complication and left the department  in stable condition.     Flexible laryngoscopy   (Indication: Need for more detailed anatomic exam. Verbal consent was obtained from the patient prior to the procedure.)    A flexible fiberoptic laryngoscope was passed into the right nostril. The right inferior turbinate, right middle turbinate, uncinate process,  and septum were observed. The scope was then passed into the choanae and nasopharynx.  The scope was then turned in a caudad direction to examine the posterior pharyngeal wall, posterior aspect of the tonsils and tonsillar pillars, base of tongue, and epiglottis. The scope was then advanced to reveal the false vocal cords, true vocal cords, and subglottis. Lastly, the pyriform sinuses were observed.     Exam findings demonstrate normal vocal cord movement but there is significant postnasal drip, moderate congestion, inferior turbinate hypertrophy and deviated nasal septum.      Assessment and Plan  Yohana Kaye is a 30 y.o. female with biopsy-proven papillary thyroid carcinoma and right pleomorphic adenoma.  I reviewed total thyroidectomy and right superficial parotidectomy in detail and the patient would like to proceed.  In particular, I reviewed the risk of recurrent laryngeal nerve injury which may be transient versus permanent.  I also reviewed the risk of facial nerve injury which may also be transient versus permanent.  I explained to her that she will need a drain in her anterior neck and also in her parotid region. She will need Synthroid after surgery and regular surveillance by her endocrinologist. There is a possibility of needing radioactive iodine afterwards as well.  She will need to spend at least 1-2 nights in the hospital after surgery.  All questions and concerns were answered to the patient's satisfaction.    As an aside, I prescribed Astelin nasal spray for her postnasal drip and congestion.     Thank you for the referral and the opportunity to participate in this patient's care.    Sincerely,      Zane Nova DO, MS, MBS, FACS  Otolaryngologist - Head & Neck Surgeon  Head and Neck Surgical Oncologist

## 2025-06-19 NOTE — PROGRESS NOTES
Otolaryngology - Head and Neck Surgery  [address], [address]  [place] [address]  Phone: [phone]  Fax: [phone]      Patient ID: Yohana Kaye                                  : 1995    Visit Date: 2025    Referring Provider: Isela Baca MD    Reason for Consultation/Chief Complaint  Chief Complaint   Patient presents with    Pre-op Visit     History of Present Illness  Yohana Kaye is a 30 y.o. female who presents with biopsy-proven right sided papillary thyroid carcinoma.  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  She had these biopsies performed on May 28, 2025.  She saw her primary care physician and dermatologist who referred her to [facility] for the parotid surgery.  She saw the plastic surgeon who told her that he/she does not do parotid surgery.  She believes that she has had the thyroid nodule for at least 2 years.  She believes that the right parotid mass started around the same time.  She does not have any difficulty swallowing, difficulty voicing, pain or any other symptoms.    Review of Systems  Fourteen point ROS is otherwise negative except for those detailed above in the History of Present Illness.    Current Medications  Current Outpatient Medications   Medication Sig Dispense Refill    cholecalciferol (VITAMIN D3) 1,250 mcg (50,000 unit) tablet Take 1 tablet (50,000 Units total) by mouth once a week. 12 tablet 0    SPRINTEC, 28, 0.25-35 mg-mcg per tablet Take 1 tablet by mouth daily.       No current facility-administered medications for this visit.       Past Medical History  No past medical history on file.    I reviewed the Past Medical History and I did not make any changes or additions.    Past Surgical History  Past Surgical History   Procedure Laterality Date    D&c first trimester / tx incomplete / missed / septic / induced          I reviewed the Past Surgical  "History and I did not make any changes or additions.    Social History  Social History     Tobacco Use    Smoking status: Former     Types: Electronic Cigarette     Passive exposure: Past    Smokeless tobacco: Never   Vaping Use    Vaping status: Never Used   Substance Use Topics    Alcohol use: Never    Drug use: Not Currently     Types: Marijuana       I reviewed the Social History and I did not make any changes or additions.    Family History  Family History   Problem Relation Name Age of Onset    Fibroids Biological Mother      Lupus Biological Mother      Thyroid disease Biological Father         I reviewed the Family History and I did not make any changes or additions.    Allergies  Patient has no known allergies.    I reviewed the Allergies and I did not make any changes or additions.    Physical Exam  Vitals: Visit Vitals  /78 (BP Location: Left upper arm, Patient Position: Sitting)   Pulse 74   Ht 1.702 m (5' 7\")   Wt 74.8 kg (165 lb)   SpO2 99%   BMI 25.84 kg/m²     General:  Well-developed, well-nourished 30 y.o. female in no acute distress  Voice: Normal without hoarseness, breathiness or stridor.  Head: normocephalic, atraumatic  Face: No scars or lesions. Normal symmetry; facial nerve is grossly intact.  There is a 1.5 to 2 cm right superficial parotid mass.  Eyes:  Extraocular muscles appear grossly intact without signs of conjunctival hemorrhage; sclerae are white  Ears: External ear is normal bilaterally.  External auditory normal without stenosis or exostosis. No cerumen. Tympanic membranes clear, mobile and without retraction or scar.  No fluid in the middle ear space. No signs of middle ear infection.  Nose: No obvious external nasal deformity is appreciated  Oral Cavity/oropharynx:  Normal tongue movement. Normal gag reflex. No mucosal lesions or masses, leukoplakia, erythroplakia, ulcers or other abnormalities of the tongue, floor of mouth, buccal mucosa, palate or posterior pharyngeal " wall are noted.  Neck: Significant anterior neck fullness consistent with thyroid goiter.  Cranial Nerves II through XII: Grossly intact  Lungs: Non-labored breathing    Pertinent radiology and labs reviewed    ULTRASOUND GUIDED THYROID BIOPSY, US TECHNICAL ASSISTANCE, ULTRASOUND GUIDED NEEDLE PLACEMENT  Narrative: CLINICAL HISTORY:    Right thyroid nodule and right cheek mass.  Impression: IMPRESSION:   Satisfactory fine-needle aspiration of right thyroid nodule and  right cheek mass.    COMMENT:  PROCEDURE:  Ultrasound guided fine-needle aspiration x2.  ANESTHESIA:  Lidocaine 1% local.  CONTRAST:  None.  MEDICATIONS: None.  DESCRIPTION OF PROCEDURE:    Written informed consent was obtained. Ultrasound  demonstrates a 6 cm right thyroid nodule and a 3.2 cm palpable right cheek mass.  The area was prepped and draped in the usual sterile manner. Using real-time  ultrasound guidance, fine needle aspiration of the right thyroid nodule was  performed with 2 separate 25-gauge needles and 2 separate 22-gauge needles.  Samples for submitted in CytoLyt and Afirma solution. Post biopsy ultrasound  demonstrates no immediate hemorrhage. The area was prepped and draped in the  usual sterile manner. Using real-time ultrasound guidance, fine needle  aspiration of the right cheek mass was performed with 2 separate 25-gauge  needles and 2 separate 22-gauge needles. Samples for submitted in CytoLyt and  RPMI solution for cytology and flow cytometry. Post biopsy ultrasound  demonstrates no immediate hemorrhage. Band-Aid were applied. The patient  tolerated the procedure without immediate complication and left the department  in stable condition.     Flexible laryngoscopy   (Indication: Need for more detailed anatomic exam. Verbal consent was obtained from the patient prior to the procedure.)    A flexible fiberoptic laryngoscope was passed into the right nostril. The right inferior turbinate, right middle turbinate, uncinate process,  and septum were observed. The scope was then passed into the choanae and nasopharynx.  The scope was then turned in a caudad direction to examine the posterior pharyngeal wall, posterior aspect of the tonsils and tonsillar pillars, base of tongue, and epiglottis. The scope was then advanced to reveal the false vocal cords, true vocal cords, and subglottis. Lastly, the pyriform sinuses were observed.     Exam findings demonstrate normal vocal cord movement but there is significant postnasal drip, moderate congestion, inferior turbinate hypertrophy and deviated nasal septum.      Assessment and Plan  Yohana Kaye is a 30 y.o. female with biopsy-proven papillary thyroid carcinoma and right pleomorphic adenoma.  I reviewed total thyroidectomy and right superficial parotidectomy in detail and the patient would like to proceed.  In particular, I reviewed the risk of recurrent laryngeal nerve injury which may be transient versus permanent.  I also reviewed the risk of facial nerve injury which may also be transient versus permanent.  I explained to her that she will need a drain in her anterior neck and also in her parotid region. She will need Synthroid after surgery and regular surveillance by her endocrinologist. There is a possibility of needing radioactive iodine afterwards as well.  She will need to spend at least 1-2 nights in the hospital after surgery.  All questions and concerns were answered to the patient's satisfaction.    As an aside, I prescribed Astelin nasal spray for her postnasal drip and congestion.     Thank you for the referral and the opportunity to participate in this patient's care.    Sincerely,      Zane Nova DO, MS, MBS, FACS  Otolaryngologist - Head & Neck Surgeon  Head and Neck Surgical Oncologist

## 2025-06-20 ENCOUNTER — PREP FOR CASE (OUTPATIENT)
Dept: OTOLARYNGOLOGY | Facility: CLINIC | Age: 30
End: 2025-06-20
Payer: COMMERCIAL

## 2025-06-20 DIAGNOSIS — C73 MALIGNANT NEOPLASM OF THYROID GLAND (CMS/HCC): Primary | ICD-10-CM

## 2025-06-20 DIAGNOSIS — K11.8 MASS OF RIGHT PAROTID GLAND: ICD-10-CM

## 2025-06-26 PROBLEM — K11.8 MASS OF RIGHT PAROTID GLAND: Status: ACTIVE | Noted: 2025-06-20

## 2025-06-26 PROBLEM — C73 MALIGNANT NEOPLASM OF THYROID GLAND (CMS/HCC): Status: ACTIVE | Noted: 2025-06-20

## 2025-07-08 LAB
ALBUMIN SERPL-MCNC: 4.4 G/DL (ref 4–5)
BUN SERPL-MCNC: 9 MG/DL (ref 6–20)
BUN/CREAT SERPL: 12 (ref 9–23)
CALCIUM SERPL-MCNC: 9.9 MG/DL (ref 8.7–10.2)
CHLORIDE SERPL-SCNC: 104 MMOL/L (ref 96–106)
CO2 SERPL-SCNC: 21 MMOL/L (ref 20–29)
CREAT SERPL-MCNC: 0.75 MG/DL (ref 0.57–1)
EGFRCR SERPLBLD CKD-EPI 2021: 111 ML/MIN/1.73
GLUCOSE SERPL-MCNC: 91 MG/DL (ref 70–99)
HCG INTACT+B SERPL-ACNC: <1 MIU/ML
POTASSIUM SERPL-SCNC: 4.2 MMOL/L (ref 3.5–5.2)
SODIUM SERPL-SCNC: 140 MMOL/L (ref 134–144)
T4 FREE SERPL-MCNC: 1.35 NG/DL (ref 0.82–1.77)
TSH SERPL DL<=0.005 MIU/L-ACNC: 0.49 UIU/ML (ref 0.45–4.5)

## 2025-07-09 ENCOUNTER — SOCIAL WORK (OUTPATIENT)
Dept: HEMATOLOGY/ONCOLOGY | Facility: HOSPITAL | Age: 30
End: 2025-07-09
Payer: COMMERCIAL

## 2025-07-09 NOTE — PROGRESS NOTES
BARRIERS TO CARE AND INTERVENTIONS  Yohana Kaye  1995 July 9, 2025    Barriers to Care: Financial; Housing Issues (7/9/2025 10:43 AM)  Interventions: Community Resource Referral; Financial Assistance: External (7/9/2025 10:43 AM)    Pt outreached to connect with [facility] for [facility] referral. [facility] educated pt on resource and informed her of current wait list. Pt was added to wait list for October [facility] nomination, and informed of active treatment requirement for eligibility. [facility] will assess additional financial support resources and will update pt on available resources.     Pt shares worries of potential eviction/homelessness. She has contacted [facility]'s [facility], however they have limited funding. [facility] encouraged her to look into their Eviction Prevention and Intervention Coalition, which provides social support and resources to patients facing eviction. [facility] also encouraged pt contact [facility]ctions in order to connect with local resources based on current need. Contact information for organizations provided to pt.     [facility]'s contact information provided. [facility] remains available for psychosocial support and resources.

## 2025-07-10 ENCOUNTER — ANESTHESIA EVENT (OUTPATIENT)
Dept: OPERATING ROOM | Facility: HOSPITAL | Age: 30
Setting detail: HOSPITAL OUTPATIENT SURGERY
End: 2025-07-10
Payer: COMMERCIAL

## 2025-07-11 ENCOUNTER — HOSPITAL ENCOUNTER (INPATIENT)
Facility: HOSPITAL | Age: 30
LOS: 1 days | Discharge: HOME | End: 2025-07-12
Attending: OTOLARYNGOLOGY | Admitting: OTOLARYNGOLOGY
Payer: COMMERCIAL

## 2025-07-11 ENCOUNTER — ANESTHESIA (OUTPATIENT)
Dept: OPERATING ROOM | Facility: HOSPITAL | Age: 30
Setting detail: HOSPITAL OUTPATIENT SURGERY
End: 2025-07-11
Payer: COMMERCIAL

## 2025-07-11 DIAGNOSIS — C73 MALIGNANT NEOPLASM OF THYROID GLAND (CMS/HCC): ICD-10-CM

## 2025-07-11 DIAGNOSIS — K11.8 MASS OF RIGHT PAROTID GLAND: ICD-10-CM

## 2025-07-11 LAB
ANION GAP SERPL CALC-SCNC: 7 MEQ/L (ref 3–15)
BUN SERPL-MCNC: 9 MG/DL (ref 7–25)
CALCIUM SERPL-MCNC: 9 MG/DL (ref 8.6–10.3)
CALCIUM SERPL-MCNC: 9 MG/DL (ref 8.6–10.3)
CHLORIDE SERPL-SCNC: 108 MEQ/L (ref 98–107)
CO2 SERPL-SCNC: 25 MEQ/L (ref 21–31)
CREAT SERPL-MCNC: 0.9 MG/DL (ref 0.6–1.2)
EGFRCR SERPLBLD CKD-EPI 2021: >60 ML/MIN/1.73M*2
GLUCOSE SERPL-MCNC: 124 MG/DL (ref 70–99)
POTASSIUM SERPL-SCNC: 3.2 MEQ/L (ref 3.5–5.1)
PTH-INTACT SERPL-MCNC: 63 PG/ML (ref 12–88)
SODIUM SERPL-SCNC: 140 MEQ/L (ref 136–145)

## 2025-07-11 PROCEDURE — 25000000 HC PHARMACY GENERAL: Performed by: OTOLARYNGOLOGY

## 2025-07-11 PROCEDURE — 71000011 HC PACU PHASE 1 EA ADDL MIN: Performed by: OTOLARYNGOLOGY

## 2025-07-11 PROCEDURE — 36000013 HC OR LEVEL 3 EA ADDL MIN: Performed by: OTOLARYNGOLOGY

## 2025-07-11 PROCEDURE — 82310 ASSAY OF CALCIUM: CPT | Performed by: OTOLARYNGOLOGY

## 2025-07-11 PROCEDURE — 63600000 HC DRUGS/DETAIL CODE: Mod: JZ | Performed by: OTOLARYNGOLOGY

## 2025-07-11 PROCEDURE — 83970 ASSAY OF PARATHORMONE: CPT | Performed by: OTOLARYNGOLOGY

## 2025-07-11 PROCEDURE — 0CB80ZZ EXCISION OF RIGHT PAROTID GLAND, OPEN APPROACH: ICD-10-PCS | Performed by: OTOLARYNGOLOGY

## 2025-07-11 PROCEDURE — 63600000 HC DRUGS/DETAIL CODE: Mod: JZ | Performed by: PHYSICIAN ASSISTANT

## 2025-07-11 PROCEDURE — 42410 EXCISE PAROTID GLAND/LESION: CPT | Mod: RT | Performed by: OTOLARYNGOLOGY

## 2025-07-11 PROCEDURE — 36000003 HC OR LEVEL 3 INITIAL 30MIN: Performed by: OTOLARYNGOLOGY

## 2025-07-11 PROCEDURE — 25000000 HC PHARMACY GENERAL: Performed by: ANESTHESIOLOGY

## 2025-07-11 PROCEDURE — 60240 REMOVAL OF THYROID: CPT | Performed by: OTOLARYNGOLOGY

## 2025-07-11 PROCEDURE — 71000001 HC PACU PHASE 1 INITIAL 30MIN: Performed by: OTOLARYNGOLOGY

## 2025-07-11 PROCEDURE — A4648 IMPLANTABLE TISSUE MARKER: HCPCS | Performed by: OTOLARYNGOLOGY

## 2025-07-11 PROCEDURE — 63600000 HC DRUGS/DETAIL CODE: Mod: JZ | Performed by: ANESTHESIOLOGY

## 2025-07-11 PROCEDURE — 12000000 HC ROOM AND CARE MED/SURG

## 2025-07-11 PROCEDURE — 37000001 HC ANESTHESIA GENERAL: Performed by: OTOLARYNGOLOGY

## 2025-07-11 PROCEDURE — 27200000 HC STERILE SUPPLY: Performed by: OTOLARYNGOLOGY

## 2025-07-11 PROCEDURE — 63700000 HC SELF-ADMINISTRABLE DRUG: Performed by: OTOLARYNGOLOGY

## 2025-07-11 PROCEDURE — 36415 COLL VENOUS BLD VENIPUNCTURE: CPT | Performed by: OTOLARYNGOLOGY

## 2025-07-11 PROCEDURE — 80048 BASIC METABOLIC PNL TOTAL CA: CPT | Performed by: OTOLARYNGOLOGY

## 2025-07-11 PROCEDURE — 25800000 HC PHARMACY IV SOLUTIONS: Performed by: OTOLARYNGOLOGY

## 2025-07-11 PROCEDURE — 25800000 HC PHARMACY IV SOLUTIONS: Performed by: ANESTHESIOLOGY

## 2025-07-11 PROCEDURE — 0GTH0ZZ RESECTION OF RIGHT THYROID GLAND LOBE, OPEN APPROACH: ICD-10-PCS | Performed by: OTOLARYNGOLOGY

## 2025-07-11 PROCEDURE — 88307 TISSUE EXAM BY PATHOLOGIST: CPT | Performed by: OTOLARYNGOLOGY

## 2025-07-11 RX ORDER — DEXAMETHASONE SODIUM PHOSPHATE 4 MG/ML
4 INJECTION, SOLUTION INTRA-ARTICULAR; INTRALESIONAL; INTRAMUSCULAR; INTRAVENOUS; SOFT TISSUE
Status: DISCONTINUED | OUTPATIENT
Start: 2025-07-11 | End: 2025-07-12 | Stop reason: HOSPADM

## 2025-07-11 RX ORDER — OXYCODONE HYDROCHLORIDE 5 MG/1
5 TABLET ORAL EVERY 4 HOURS PRN
Status: DISCONTINUED | OUTPATIENT
Start: 2025-07-11 | End: 2025-07-11

## 2025-07-11 RX ORDER — DEXTROSE 40 %
15-30 GEL (GRAM) ORAL AS NEEDED
Status: DISCONTINUED | OUTPATIENT
Start: 2025-07-11 | End: 2025-07-12 | Stop reason: HOSPADM

## 2025-07-11 RX ORDER — ACETAMINOPHEN 325 MG/1
650 TABLET ORAL EVERY 4 HOURS PRN
Status: DISCONTINUED | OUTPATIENT
Start: 2025-07-11 | End: 2025-07-11

## 2025-07-11 RX ORDER — DEXTROSE 50 % IN WATER (D50W) INTRAVENOUS SYRINGE
25 AS NEEDED
Status: DISCONTINUED | OUTPATIENT
Start: 2025-07-11 | End: 2025-07-12 | Stop reason: HOSPADM

## 2025-07-11 RX ORDER — LIDOCAINE HYDROCHLORIDE 10 MG/ML
INJECTION, SOLUTION EPIDURAL; INFILTRATION; INTRACAUDAL; PERINEURAL AS NEEDED
Status: DISCONTINUED | OUTPATIENT
Start: 2025-07-11 | End: 2025-07-11 | Stop reason: SURG

## 2025-07-11 RX ORDER — FENTANYL CITRATE 50 UG/ML
50 INJECTION, SOLUTION INTRAMUSCULAR; INTRAVENOUS EVERY 5 MIN PRN
Status: COMPLETED | OUTPATIENT
Start: 2025-07-11 | End: 2025-07-11

## 2025-07-11 RX ORDER — ADHESIVE BANDAGE 7/8"
15-30 BANDAGE TOPICAL AS NEEDED
Status: DISCONTINUED | OUTPATIENT
Start: 2025-07-11 | End: 2025-07-12 | Stop reason: HOSPADM

## 2025-07-11 RX ORDER — OXYCODONE HYDROCHLORIDE 5 MG/1
5 TABLET ORAL EVERY 4 HOURS PRN
Refills: 0 | Status: DISCONTINUED | OUTPATIENT
Start: 2025-07-11 | End: 2025-07-12 | Stop reason: HOSPADM

## 2025-07-11 RX ORDER — ADHESIVE BANDAGE 7/8"
15-30 BANDAGE TOPICAL AS NEEDED
Status: DISCONTINUED | OUTPATIENT
Start: 2025-07-11 | End: 2025-07-11 | Stop reason: HOSPADM

## 2025-07-11 RX ORDER — LIDOCAINE HYDROCHLORIDE AND EPINEPHRINE 10; 10 UG/ML; MG/ML
INJECTION, SOLUTION INFILTRATION; PERINEURAL
Status: DISCONTINUED | OUTPATIENT
Start: 2025-07-11 | End: 2025-07-11 | Stop reason: HOSPADM

## 2025-07-11 RX ORDER — SODIUM CHLORIDE, SODIUM GLUCONATE, SODIUM ACETATE, POTASSIUM CHLORIDE AND MAGNESIUM CHLORIDE 30; 37; 368; 526; 502 MG/100ML; MG/100ML; MG/100ML; MG/100ML; MG/100ML
125 INJECTION, SOLUTION INTRAVENOUS CONTINUOUS
Status: DISCONTINUED | OUTPATIENT
Start: 2025-07-11 | End: 2025-07-12 | Stop reason: HOSPADM

## 2025-07-11 RX ORDER — MORPHINE SULFATE 2 MG/ML
1 INJECTION, SOLUTION INTRAMUSCULAR; INTRAVENOUS EVERY 4 HOURS PRN
Status: DISCONTINUED | OUTPATIENT
Start: 2025-07-11 | End: 2025-07-12 | Stop reason: HOSPADM

## 2025-07-11 RX ORDER — DEXTROSE 50 % IN WATER (D50W) INTRAVENOUS SYRINGE
25 AS NEEDED
Status: DISCONTINUED | OUTPATIENT
Start: 2025-07-11 | End: 2025-07-11 | Stop reason: HOSPADM

## 2025-07-11 RX ORDER — MIDAZOLAM HYDROCHLORIDE 2 MG/2ML
INJECTION, SOLUTION INTRAMUSCULAR; INTRAVENOUS AS NEEDED
Status: DISCONTINUED | OUTPATIENT
Start: 2025-07-11 | End: 2025-07-11 | Stop reason: SURG

## 2025-07-11 RX ORDER — ONDANSETRON HYDROCHLORIDE 2 MG/ML
INJECTION, SOLUTION INTRAVENOUS AS NEEDED
Status: DISCONTINUED | OUTPATIENT
Start: 2025-07-11 | End: 2025-07-11 | Stop reason: SURG

## 2025-07-11 RX ORDER — ONDANSETRON HYDROCHLORIDE 2 MG/ML
4 INJECTION, SOLUTION INTRAVENOUS ONCE
Status: DISCONTINUED | OUTPATIENT
Start: 2025-07-11 | End: 2025-07-11

## 2025-07-11 RX ORDER — HYDROMORPHONE HYDROCHLORIDE 1 MG/ML
0.5 INJECTION, SOLUTION INTRAMUSCULAR; INTRAVENOUS; SUBCUTANEOUS
Status: DISCONTINUED | OUTPATIENT
Start: 2025-07-11 | End: 2025-07-11 | Stop reason: HOSPADM

## 2025-07-11 RX ORDER — PROPOFOL 10 MG/ML
INJECTION, EMULSION INTRAVENOUS AS NEEDED
Status: DISCONTINUED | OUTPATIENT
Start: 2025-07-11 | End: 2025-07-11 | Stop reason: SURG

## 2025-07-11 RX ORDER — ALUMINUM HYDROXIDE, MAGNESIUM HYDROXIDE, AND SIMETHICONE 1200; 120; 1200 MG/30ML; MG/30ML; MG/30ML
30 SUSPENSION ORAL EVERY 4 HOURS PRN
Status: DISCONTINUED | OUTPATIENT
Start: 2025-07-11 | End: 2025-07-12 | Stop reason: HOSPADM

## 2025-07-11 RX ORDER — POTASSIUM CHLORIDE 20 MEQ/1
40 TABLET, EXTENDED RELEASE ORAL ONCE
Status: COMPLETED | OUTPATIENT
Start: 2025-07-11 | End: 2025-07-11

## 2025-07-11 RX ORDER — ROCURONIUM BROMIDE 50 MG/5 ML
SYRINGE (ML) INTRAVENOUS AS NEEDED
Status: DISCONTINUED | OUTPATIENT
Start: 2025-07-11 | End: 2025-07-11 | Stop reason: SURG

## 2025-07-11 RX ORDER — DEXTROSE 40 %
15-30 GEL (GRAM) ORAL AS NEEDED
Status: DISCONTINUED | OUTPATIENT
Start: 2025-07-11 | End: 2025-07-11 | Stop reason: HOSPADM

## 2025-07-11 RX ORDER — FENTANYL CITRATE 50 UG/ML
INJECTION, SOLUTION INTRAMUSCULAR; INTRAVENOUS AS NEEDED
Status: DISCONTINUED | OUTPATIENT
Start: 2025-07-11 | End: 2025-07-11 | Stop reason: SURG

## 2025-07-11 RX ORDER — ONDANSETRON 4 MG/1
4 TABLET, ORALLY DISINTEGRATING ORAL EVERY 8 HOURS PRN
Status: DISCONTINUED | OUTPATIENT
Start: 2025-07-11 | End: 2025-07-12 | Stop reason: HOSPADM

## 2025-07-11 RX ORDER — ONDANSETRON HYDROCHLORIDE 2 MG/ML
4 INJECTION, SOLUTION INTRAVENOUS
Status: DISCONTINUED | OUTPATIENT
Start: 2025-07-11 | End: 2025-07-11 | Stop reason: HOSPADM

## 2025-07-11 RX ORDER — ACETAMINOPHEN 325 MG/1
650 TABLET ORAL EVERY 4 HOURS PRN
Status: DISCONTINUED | OUTPATIENT
Start: 2025-07-11 | End: 2025-07-12 | Stop reason: HOSPADM

## 2025-07-11 RX ORDER — SODIUM CHLORIDE 9 MG/ML
INJECTION, SOLUTION INTRAVENOUS CONTINUOUS PRN
Status: DISCONTINUED | OUTPATIENT
Start: 2025-07-11 | End: 2025-07-11 | Stop reason: SURG

## 2025-07-11 RX ORDER — SODIUM CHLORIDE 9 MG/ML
INJECTION, SOLUTION INTRAVENOUS CONTINUOUS
Status: DISCONTINUED | OUTPATIENT
Start: 2025-07-11 | End: 2025-07-12 | Stop reason: HOSPADM

## 2025-07-11 RX ADMIN — OXYCODONE HYDROCHLORIDE 5 MG: 5 TABLET ORAL at 18:28

## 2025-07-11 RX ADMIN — HYDROMORPHONE HYDROCHLORIDE 0.5 MG: 1 INJECTION, SOLUTION INTRAMUSCULAR; INTRAVENOUS; SUBCUTANEOUS at 14:43

## 2025-07-11 RX ADMIN — FENTANYL CITRATE 50 MCG: 50 INJECTION INTRAMUSCULAR; INTRAVENOUS at 13:16

## 2025-07-11 RX ADMIN — FENTANYL CITRATE 50 MCG: 50 INJECTION, SOLUTION INTRAMUSCULAR; INTRAVENOUS at 13:32

## 2025-07-11 RX ADMIN — FENTANYL CITRATE 50 MCG: 50 INJECTION, SOLUTION INTRAMUSCULAR; INTRAVENOUS at 14:14

## 2025-07-11 RX ADMIN — FENTANYL CITRATE 50 MCG: 50 INJECTION INTRAMUSCULAR; INTRAVENOUS at 12:52

## 2025-07-11 RX ADMIN — FENTANYL CITRATE 150 MCG: 50 INJECTION INTRAMUSCULAR; INTRAVENOUS at 09:32

## 2025-07-11 RX ADMIN — POTASSIUM CHLORIDE 40 MEQ: 1500 TABLET, EXTENDED RELEASE ORAL at 18:28

## 2025-07-11 RX ADMIN — Medication 10 ML: at 09:32

## 2025-07-11 RX ADMIN — MORPHINE SULFATE 1 MG: 2 INJECTION, SOLUTION INTRAMUSCULAR; INTRAVENOUS at 20:16

## 2025-07-11 RX ADMIN — MIDAZOLAM HYDROCHLORIDE 2 MG: 1 INJECTION, SOLUTION INTRAMUSCULAR; INTRAVENOUS at 09:27

## 2025-07-11 RX ADMIN — SUCCINYLCHOLINE CHLORIDE 100 MG: 20 INJECTION, SOLUTION INTRAMUSCULAR; INTRAVENOUS; PARENTERAL at 09:32

## 2025-07-11 RX ADMIN — ACETAMINOPHEN 650 MG: 325 TABLET ORAL at 16:07

## 2025-07-11 RX ADMIN — CEFAZOLIN 2 G: 2 INJECTION, POWDER, FOR SOLUTION INTRAMUSCULAR; INTRAVENOUS at 09:37

## 2025-07-11 RX ADMIN — ONDANSETRON 8 MG: 2 INJECTION INTRAMUSCULAR; INTRAVENOUS at 17:41

## 2025-07-11 RX ADMIN — SODIUM CHLORIDE: 9 INJECTION, SOLUTION INTRAVENOUS at 23:58

## 2025-07-11 RX ADMIN — SODIUM CHLORIDE: 9 INJECTION, SOLUTION INTRAVENOUS at 14:24

## 2025-07-11 RX ADMIN — SODIUM CHLORIDE: 0.9 INJECTION, SOLUTION INTRAVENOUS at 09:26

## 2025-07-11 RX ADMIN — MORPHINE SULFATE 1 MG: 2 INJECTION, SOLUTION INTRAMUSCULAR; INTRAVENOUS at 23:56

## 2025-07-11 RX ADMIN — ONDANSETRON 4 MG: 2 INJECTION INTRAMUSCULAR; INTRAVENOUS at 12:52

## 2025-07-11 RX ADMIN — PROPOFOL 180 MG: 10 INJECTION, EMULSION INTRAVENOUS at 09:32

## 2025-07-11 RX ADMIN — REMIFENTANIL HYDROCHLORIDE 0.2 MCG/KG/MIN: 2 INJECTION, POWDER, LYOPHILIZED, FOR SOLUTION INTRAVENOUS at 09:36

## 2025-07-11 RX ADMIN — REMIFENTANIL HYDROCHLORIDE 0.1 MCG/KG/MIN: 2 INJECTION, POWDER, LYOPHILIZED, FOR SOLUTION INTRAVENOUS at 12:02

## 2025-07-11 RX ADMIN — DEXAMETHASONE SODIUM PHOSPHATE 4 MG: 4 INJECTION, SOLUTION INTRA-ARTICULAR; INTRALESIONAL; INTRAMUSCULAR; INTRAVENOUS; SOFT TISSUE at 20:02

## 2025-07-11 RX ADMIN — Medication 10 MG: at 09:32

## 2025-07-11 ASSESSMENT — COGNITIVE AND FUNCTIONAL STATUS - GENERAL
CLIMB 3 TO 5 STEPS WITH RAILING: 3 - A LITTLE
MOVING TO AND FROM BED TO CHAIR: 3 - A LITTLE
CLIMB 3 TO 5 STEPS WITH RAILING: 3 - A LITTLE
STANDING UP FROM CHAIR USING ARMS: 3 - A LITTLE
MOVING TO AND FROM BED TO CHAIR: 4 - NONE
WALKING IN HOSPITAL ROOM: 3 - A LITTLE
WALKING IN HOSPITAL ROOM: 3 - A LITTLE
STANDING UP FROM CHAIR USING ARMS: 4 - NONE

## 2025-07-11 NOTE — ANESTHESIOLOGIST PRE-PROCEDURE ATTESTATION
Pre-Procedure Patient Identification:  I am the Primary Anesthesiologist and have identified the patient on 07/11/25 at 9:07 AM.   I have confirmed the procedure(s) will be performed by the following surgeon/proceduralist Zane Nova DO.

## 2025-07-11 NOTE — ANESTHESIA POSTPROCEDURE EVALUATION
Patient: Yohana Kaye    Procedure Summary       Date: 07/11/25 Room / Location: [facility] OR 04 / [facility] OR [facility]    Anesthesia Start: 0925 Anesthesia Stop: 1324    Procedures:       Total thyroidectomy (Neck)      Right superficial parotidectomy With facial nerve monitoring (Right: Face) Diagnosis:       Malignant neoplasm of thyroid gland (CMS/HCC)      Mass of right parotid gland      (Malignant neoplasm of thyroid gland (CMS/HCC) [C73])      (Mass of right parotid gland [K11.8])    Surgeons: Zane Nova DO Responsible Provider: Kam Gracia MD    Anesthesia Type: general ASA Status: 3            Anesthesia Type: general  PACU Vitals  7/11/2025 1318 - 7/11/2025 1418        7/11/2025  1325 7/11/2025  1330 7/11/2025  1332 7/11/2025  1345    BP: 163/88 163/96 163/96 159/91    Temp: 36.3 °C (97.4 °F) -- -- --    Pulse: 106 107 108 104    Resp: 1 17 20 19    SpO2: 100 % 100 % 100 % 100 %                7/11/2025  1400 7/11/2025  1414 7/11/2025  1415       BP: 153/80 155/79 155/79     Temp: -- -- --     Pulse: 103 104 102     Resp: 27 21 23     SpO2: 100 % 100 % 100 %               Anesthesia Post Evaluation    Pain management: adequate  Patient location during evaluation: PACU  Patient participation: complete - patient participated  Level of consciousness: awake and alert  Cardiovascular status: acceptable  Airway Patency: adequate  Respiratory status: acceptable  Hydration status: acceptable  Anesthetic complications: no

## 2025-07-11 NOTE — ANESTHESIA PREPROCEDURE EVALUATION
Relevant Problems   HEMATOLOGY   (+) Anemia, iron deficiency   (+) Iron deficiency anemia      NEUROLOGY   (+) Depressive disorder       Anesthesia ROS/MED HX    Anesthesia History - neg  Pulmonary - neg  Neuro/Psych - neg  Cardiovascular- neg  Hematological - neg  GI/Hepatic- neg  Musculoskeletal- neg  Renal Disease- neg  Endo/Other- neg       Past Surgical History   Procedure Laterality Date    D&c first trimester / tx incomplete / missed / septic / induced       Fracture surgery Left     tiba     Patient Active Problem List   Diagnosis    Depressive disorder    Iron deficiency anemia    Monocytosis    Anemia, iron deficiency    Thyroid nodule    Thyroid cancer (CMS/HCC)    Vitamin D deficiency    Malignant neoplasm of thyroid gland (CMS/HCC)    Mass of right parotid gland        Past Medical History:   Diagnosis Date    Disease of thyroid gland     cancer    Eczema     hx of    Seasonal allergies     hx of       Past Surgical History   Procedure Laterality Date    D&c first trimester / tx incomplete / missed / septic / induced       Fracture surgery Left     tiba       Social History     Socioeconomic History    Marital status: Single     Spouse name: Not on file    Number of children: Not on file    Years of education: Not on file    Highest education level: Not on file   Occupational History    Not on file   Tobacco Use    Smoking status: Former     Types: Electronic Cigarette     Passive exposure: Past    Smokeless tobacco: Never    Tobacco comments:     Quite     Vaping Use    Vaping status: Never Used   Substance and Sexual Activity    Alcohol use: Never    Drug use: Not Currently     Types: Marijuana    Sexual activity: Yes     Partners: Male     Comment: single   Other Topics Concern    Not on file   Social History Narrative    Not on file     Social Drivers of Health     Financial Resource Strain: Not on file   Food Insecurity: No Food Insecurity (2022)    Hunger Vital Sign      Worried About Running Out of Food in the Last Year: Never true     Ran Out of Food in the Last Year: Never true   Transportation Needs: Not on file   Physical Activity: Not on file   Stress: Not on file   Social Connections: Not on file   Intimate Partner Violence: Not on file   Housing Stability: Not on file       No Known Allergies    Current Facility-Administered Medications   Medication Dose Route Frequency    ceFAZolin  2 g intravenous 60 min Pre-Op       Prior to Admission medications   Medication Sig Start Date End Date Taking? Authorizing Provider   azelastine (ASTELIN) 137 mcg (0.1 %) nasal spray Administer 2 sprays into each nostril nightly. Apply two sprays in each nostril preferably before bedtime.  Ensure to point spray nozzle away from the middle when spraying. 6/19/25 6/19/26 Yes Zane Nova DO   cholecalciferol (VITAMIN D3) 1,250 mcg (50,000 unit) tablet Take 1 tablet (50,000 Units total) by mouth once a week. 5/30/25  Yes Aurelia Chin MD   SPRINTEC, 28, 0.25-35 mg-mcg per tablet Take 1 tablet by mouth daily. 3/14/25  Yes Provider, Jayda Webster MD       Vitals:    07/11/25 0656   BP: (!) 152/81   Pulse: 68   Resp: 18   Temp: 36.3 °C (97.4 °F)   TempSrc: Temporal   SpO2: 98%       CBC Results         11/06/22 06/29/21     1902 0113    WBC 12.23 13.51    RBC 4.62 4.74    HGB 11.6 11.7    HCT 36.2 36.3    MCV 78.4 76.6    MCH 25.1 24.7    MCHC 32.0 32.2     346            BMP Results         07/07/25 11/06/22 06/29/21     1023 1902 0113     133 132    K 4.2 3.7 3.9    Cl 104 100 99    CO2 21 21 25    Glucose 91 88 98    BUN 9 8 6    Creatinine 0.75 0.7 0.8    Calcium -- 9.7 9.6    Anion Gap -- 12 8    EGFR 111 >60.0 >60.0           Comment for K at 1902 on 11/06/22: Results obtained on plasma. Plasma Potassium values may be up to 0.4 mEQ/L less than serum values. The differences may be greater for patients with high platelet or white cell counts.    Comment for K at 0116  on 06/29/21: Results obtained on plasma. Plasma Potassium values may be up to 0.4 mEQ/L less than serum values. The differences may be greater for patients with high platelet or white cell counts.                Physical Exam    Airway   Mallampati: II   TM distance: >3 FB   Neck ROM: full  Cardiovascular - normal   Rhythm: regular   Rate: normalPulmonary - normal   clear to auscultation  Dental - normal        Anesthesia Plan    Plan: general    Technique: general endotracheal and total IV anesthesia     Lines and Monitors: PIV     Airway: natural airway / supplemental oxygen    3 ASA  Induction:    intravenous

## 2025-07-11 NOTE — ANESTHESIA PROCEDURE NOTES
Airway  Reason: elective    Start Time: 7/11/2025 9:34 AM    General Information and Staff   Patient location during procedure: OR  Anesthesiologist: Kam Gracia MD  Performed by: Kam Gracia MD  Authorized by: Kam Gracia MD        Patient Condition  Indications for airway management: anesthesia  Patient position: sniffing  Sedation level: deep     Final Airway Details   Preoxygenated: yes  Final airway type: endotracheal airway  Successful airway: ETT   Successful intubation technique: video laryngoscopy  Adjuncts used in placement: intubating stylet  Endotracheal tube insertion site: oral  Blade: Marisol  Blade size: #3  ETT size (mm): 7.0  Cormack-Lehane Classification: grade I - full view of glottis  Placement verified by: chest auscultation and capnometry   Measured from: lips  ETT to lips (cm): 21  Ventilation between attempts: BVM  Number of attempts at approach: 1  Number of other approaches attempted: 0  Atraumatic airway insertion

## 2025-07-11 NOTE — OR SURGEON
Pre-Procedure patient identification:  I am the primary operating surgeon/proceduralist and I have reviewed the applicable pathology reports and radiology studies for this procedure. I have identified the patient on 07/11/25 at 6:51 AM Zane Nova DO  Phone Number: [phone]

## 2025-07-11 NOTE — OP NOTE
Otolaryngology - Head and Neck Surgery Operative Report    Pre-Operative Diagnosis: Right papillary thyroid carcinoma, goiter, right parotid mass consistent with pleomorphic adenoma    Post-Operative Diagnosis: Same    Surgeon: Zane Nova DO    Assistants: RAFA White    Anesthesia: General endotracheal anesthesia with NIMS tube    ASA Class: 3    Indications for Procedure: This patient was found to have a right thyroid nodule with biopsy proven papillary thyroid carcinoma. She also had a right parotid mass consistent with pleomorphic adenoma. I discussed benefits, alternatives, and complications in great detail with the patient and the patient agreed to proceed.  I consented the patient for total thyroidectomy.     Procedure: Total thyroidectomy with recurrent laryngeal nerve monitoring, right superficial parotidectomy with facial nerve monitoring    Procedure Details: The patient was brought to the operating room and placed in the supine position.  General anesthesia with endotracheal intubation was induced using a Glidescope.  The position of the NIMS ETT was confirmed to be in the right position at the level of the glottis.  A marking pen was then used to larry the cricoid cartilage and sternal notch.  A horizontal incision was marked in a skin crease between these landmarks low in the neck. Another incision was marked on the patient's right face in front of the ear. A timeout was performed.  1% lidocaine with 1:100,000 was used to infiltrate the incision site. Approximately 5 cc was utilized. The patient was then prepped and draped in the usual fashion. A #15 blade knife was used to make the skin incision, which was carried down through the platysma laterally to the level of the strap muscles. Subplatysmal flaps were elevated superiorly and inferiorly. The strap muscles were split in the midline raphae.  The right thyroid lobe was then identified and blunt dissection was undertaken to  separate that the sternothyroid muscle off the thyroid lobe.  The superior pole was identified and with meticulous dissection, the superior thyroid vessels were isolated and dissected free.  Using the Ligasure, these vessels were then ligated.  The right thyroid lobe was then rotated medially and outward out of the wound bed and the middle thyroid vein was identified and ligated.  The recurrent laryngeal nerve was identified in the tracheoesophageal groove using blunt dissection and was traced to the level of the cricothyroid joint.  The inferior thyroid pole vessels were ligated.  Clifford's ligaments were also identified and dissected free ensuring to ligate any blood vessels in the area.  The thyroid lobe was freed completely and handed to the scrub technician. The superior and inferior parathyroid glands were identified and left intact. The recurrent laryngeal nerve was stimulated and deemed to be intact.  The left thyroid lobe superior pole was identified and with meticulous dissection, the superior thyroid vessels were isolated and dissected free.  Using the Ligasure, these vessels were then ligated. The left thyroid lobe was then rotated medially and outward out of the wound bed and the middle thyroid vein was identified and ligated. The recurrent laryngeal nerve was identified in the tracheoesophageal groove using blunt dissection and was traced to the level of the cricothyroid joint.  The inferior thyroid pole vessels were ligated.  Clifford's ligaments were also identified and dissected free ensuring to ligate any blood vessels in the area. The superior and inferior parathyroid glands were identified and left intact. The recurrent laryngeal nerve was stimulated and deemed to be intact.  The isthmus was sharply dissected free with the left and right thyroid lobe. The entire thyroid with isthmus was sent en bloc. A Valsalva was performed and hemostasis was achieved in the wound bed.    A #15 Persian Meng-Barrera  drain was placed into the wound bed and secured to the skin with 2-0 silk suture. The wound was then closed by reapproximating the strap muscles and platysma with deep 3-0 Vicryl stitches. The skin was closed with running 5-0 fast-absorbing suture on the skin with Steri-Strips were placed over the incision.     The NIMS monitor was then switched to the electrodes for the orbicularis oris and orbicularis oculi. A #15 blade was used to incise the previously marked incision.  Sharp dissection was used to elevated the subcutaneous plane.  With blunt and sharp dissection with a heavy face lift scissor, the subcutaneous plan was developed anteriorly until the masseter muscle was identified.  Lone star retractors were placed to retract the skin.  The mastoid tip was also identified, and the facial nerve was found between these two structures.   The mass was adherent to the soft tissue surrounding it. In a blunt fashion, the mas was dissected in a 360 degree fashion. While doing this, the facial nerve was identified and stimulated.  The upper and lower divisions of the nerve were then followed anteriorly.  The mass was identified and dissected en bloc with a cuff of normal tissue surrounding it.  It was passed off to the scrub technician for pathology.  The wound was irrigated with normal saline.  The facial nerve was stimulated again and confirmed to be intact.  The parotid fascia was closed with interrupted 3-0 Vicryl sutures.  A 15 German fully fluted drain was placed in the wound bed and secured with a 2-0 nylon stitch.  The skin was approximated with interrupted 3-0 vicryl deep dermal sutures. The superficial layer was closed with running 5-0 fast absorbing suture. The wound was dressed with Steri-strips.    The patient tolerated the procedure well, was extubated in the operating room and transferred uneventfully to the post anesthesia care unit. The patient had a strong voice at the end of the case. All sponge  instrument counts were correct at the end the case and a debrief was also performed with the circulating nurse.     Findings: As above     Estimated Blood Loss:  35 cc           Drains:  2 x 15 Belarusian SCOTT drains (left neck and right upper neck)                 Specimens:  Right and left thyroid lobe, right superficial parotid mass                 Complications: None           Disposition: Stable to PACU            Zane Nova DO, MS, MBS, FACS  Otolaryngologist - Head & Neck Surgeon

## 2025-07-12 VITALS
RESPIRATION RATE: 16 BRPM | SYSTOLIC BLOOD PRESSURE: 129 MMHG | TEMPERATURE: 98.4 F | DIASTOLIC BLOOD PRESSURE: 64 MMHG | HEART RATE: 66 BPM | OXYGEN SATURATION: 98 %

## 2025-07-12 LAB
ANION GAP SERPL CALC-SCNC: 9 MEQ/L (ref 3–15)
BUN SERPL-MCNC: 6 MG/DL (ref 7–25)
CALCIUM SERPL-MCNC: 9.5 MG/DL (ref 8.6–10.3)
CHLORIDE SERPL-SCNC: 105 MEQ/L (ref 98–107)
CO2 SERPL-SCNC: 23 MEQ/L (ref 21–31)
CREAT SERPL-MCNC: 0.7 MG/DL (ref 0.6–1.2)
EGFRCR SERPLBLD CKD-EPI 2021: >60 ML/MIN/1.73M*2
GLUCOSE SERPL-MCNC: 153 MG/DL (ref 70–99)
POTASSIUM SERPL-SCNC: 4 MEQ/L (ref 3.5–5.1)
SODIUM SERPL-SCNC: 137 MEQ/L (ref 136–145)

## 2025-07-12 PROCEDURE — 80048 BASIC METABOLIC PNL TOTAL CA: CPT | Performed by: OTOLARYNGOLOGY

## 2025-07-12 PROCEDURE — 63700000 HC SELF-ADMINISTRABLE DRUG: Performed by: OTOLARYNGOLOGY

## 2025-07-12 PROCEDURE — 36415 COLL VENOUS BLD VENIPUNCTURE: CPT | Performed by: OTOLARYNGOLOGY

## 2025-07-12 PROCEDURE — 63600000 HC DRUGS/DETAIL CODE: Mod: JZ | Performed by: OTOLARYNGOLOGY

## 2025-07-12 RX ORDER — ONDANSETRON 4 MG/1
8 TABLET, ORALLY DISINTEGRATING ORAL EVERY 8 HOURS PRN
Qty: 20 TABLET | Refills: 0 | Status: SHIPPED | OUTPATIENT
Start: 2025-07-12 | End: 2025-07-19

## 2025-07-12 RX ORDER — LEVOTHYROXINE SODIUM 125 UG/1
120 TABLET ORAL
Qty: 30 TABLET | Refills: 0 | Status: SHIPPED | OUTPATIENT
Start: 2025-07-13 | End: 2025-08-12

## 2025-07-12 RX ORDER — OXYCODONE HYDROCHLORIDE 5 MG/1
5 TABLET ORAL EVERY 4 HOURS PRN
Qty: 20 TABLET | Refills: 0 | Status: SHIPPED | OUTPATIENT
Start: 2025-07-12 | End: 2025-07-17

## 2025-07-12 RX ADMIN — ACETAMINOPHEN 650 MG: 325 TABLET ORAL at 09:14

## 2025-07-12 RX ADMIN — OXYCODONE HYDROCHLORIDE 5 MG: 5 TABLET ORAL at 10:41

## 2025-07-12 RX ADMIN — LEVOTHYROXINE SODIUM 119 MCG: 0.09 TABLET ORAL at 06:48

## 2025-07-12 RX ADMIN — OXYCODONE HYDROCHLORIDE 5 MG: 5 TABLET ORAL at 06:45

## 2025-07-12 RX ADMIN — DEXAMETHASONE SODIUM PHOSPHATE 4 MG: 4 INJECTION, SOLUTION INTRA-ARTICULAR; INTRALESIONAL; INTRAMUSCULAR; INTRAVENOUS; SOFT TISSUE at 04:25

## 2025-07-12 ASSESSMENT — COGNITIVE AND FUNCTIONAL STATUS - GENERAL
STANDING UP FROM CHAIR USING ARMS: 4 - NONE
CLIMB 3 TO 5 STEPS WITH RAILING: 4 - NONE
WALKING IN HOSPITAL ROOM: 4 - NONE
MOVING TO AND FROM BED TO CHAIR: 4 - NONE

## 2025-07-12 NOTE — DISCHARGE SUMMARY
Inpatient Discharge Summary    BRIEF OVERVIEW  Admitting Provider:      Attending Provider: Zane Nova DO Attending phys phone: [phone]  Primary Care Physician at Discharge: Isela Baca [phone]    Admission Date: 7/11/2025     Discharge Date: 7/12/2025    Primary Discharge Diagnosis  Malignant neoplasm of thyroid gland (CMS/HCC)    Secondary Discharge Diagnosis  Active Hospital Problems    Diagnosis Date Noted    Malignant neoplasm of thyroid gland (CMS/HCC) 06/20/2025    Mass of right parotid gland 06/20/2025      Resolved Hospital Problems   No resolved problems to display.       DETAILS OF HOSPITAL STAY    Operative Procedures Performed  Procedure(s):  Total thyroidectomy  Right superficial parotidectomy With facial nerve monitoring    Consult Orders During Admission:  None     Imaging  No results found.      Presenting Problem/History of Present Illness  Malignant neoplasm of thyroid gland (CMS/HCC) [C73]  Mass of right parotid gland [K11.8]       Exam on Day of Discharge  Patient seen and examined on day of discharge. Patient with soft neck. Voice strong and facial nerve grossly intact.    Hospital Course  This is a 30 year old female s/p total thyroidectomy and right superficial parotidectomy. She was admitted to monitor her calcium and for pain control. She had postoperative nausea and vomiting on POD #0 but this improved significantly on POD #1. Her pain was controlled and she was deemed stable for discharge. Her calcium level was normal during her admission.    Discharge Orders     Medication List        START taking these medications      levothyroxine 125 mcg tablet  Commonly known as: SYNTHROID  Start taking on: July 13, 2025  Take 1 tablet (125 mcg total) by mouth daily.  Dose: 125 mcg     ondansetron ODT 4 mg disintegrating tablet  Commonly known as: ZOFRAN-ODT  Take 2 tablets (8 mg total) by mouth every 8 (eight) hours as needed for nausea or vomiting (Nausea/Vomiting)  "for up to 7 days.  Dose: 8 mg     oxyCODONE 5 mg immediate release tablet  Commonly known as: ROXICODONE  Take 1 tablet (5 mg total) by mouth every 4 (four) hours as needed for pain (moderate pain) for up to 5 days.  Dose: 5 mg     phenoL 1.4 % aerosol,spray mucosal spray  Commonly known as: SORE THROAT SPRAY  Apply 1 spray to the mouth or throat every 2 (two) hours as needed for sore throat.  Dose: 1 spray            CONTINUE taking these medications      azelastine 137 mcg (0.1 %) nasal spray  Commonly known as: ASTELIN  Administer 2 sprays into each nostril nightly. Apply two sprays in each nostril preferably before bedtime.  Ensure to point spray nozzle away from the middle when spraying.  Dose: 2 spray     cholecalciferol 1,250 mcg (50,000 unit) tablet  Commonly known as: VITAMIN D3  Take 1 tablet (50,000 Units total) by mouth once a week.  Dose: 50,000 Units     SPRINTEC (28) 0.25-0.035 mg per tablet  Take 1 tablet by mouth daily.  Dose: 1 tablet  Generic drug: norgestimate-ethinyl estradioL                Instructions for after discharge       Call provider for:  temperature >100.4      Discharge Diet      Diet Type / Texture: Regular    Review additional discharge directions in \"Instructions\" section on the last page              Outpatient Follow-Ups            In 3 months Aurelia Chin MD [facility] in [place]          Referrals:  No orders of the defined types were placed in this encounter.    Active Issues Requiring Follow-up  Follow up with Daxa Addison PA-C, on Monday, 7/14 at [facility] for drain removal.  Follow up with Dr. Nova in two weeks after your visit with Daxa to review pathology and to check progress.    Test Results Pending at Discharge  Unresulted Labs (From admission, onward)      None              Discharge Disposition  Disposition:    Destination:        Code Status at Discharge: Full Code  Physician Order for Life-Sustaining Treatment Document " Status        No documents found

## 2025-07-12 NOTE — DISCHARGE INSTRUCTIONS
Take Percocet for pain.  Use phenol sore throat spray for sore throat.  Monitor drain output and write down output every 12-24 hours.  OK to bathe/shower.  No diet restrictions.

## 2025-07-12 NOTE — PLAN OF CARE
Plan of Care Review  Plan of Care Reviewed With: patient  Progress: improving  Outcome Evaluation: Neck and Right face dressing CDI, JPx2 with small amt bloody drainge. Pain down to 5/10 and Nausea improved. Lion po fluids and voiding.

## 2025-07-14 ENCOUNTER — OFFICE VISIT (OUTPATIENT)
Dept: OTOLARYNGOLOGY | Facility: CLINIC | Age: 30
End: 2025-07-14
Payer: COMMERCIAL

## 2025-07-14 ENCOUNTER — SOCIAL WORK (OUTPATIENT)
Dept: HEMATOLOGY/ONCOLOGY | Facility: HOSPITAL | Age: 30
End: 2025-07-14
Payer: COMMERCIAL

## 2025-07-14 VITALS
HEART RATE: 81 BPM | HEIGHT: 67 IN | WEIGHT: 165 LBS | DIASTOLIC BLOOD PRESSURE: 80 MMHG | OXYGEN SATURATION: 99 % | SYSTOLIC BLOOD PRESSURE: 124 MMHG | BODY MASS INDEX: 25.9 KG/M2

## 2025-07-14 DIAGNOSIS — C73 THYROID CANCER (CMS/HCC): Primary | ICD-10-CM

## 2025-07-14 DIAGNOSIS — K11.8 MASS OF RIGHT PAROTID GLAND: ICD-10-CM

## 2025-07-14 PROCEDURE — 99024 POSTOP FOLLOW-UP VISIT: CPT | Performed by: PHYSICIAN ASSISTANT

## 2025-07-14 NOTE — PROGRESS NOTES
BARRIERS TO CARE AND INTERVENTIONS  Yohana Kaye  1995 July 14, 2025    Barriers to Care: Financial (7/14/2025  2:39 PM)  Interventions: Financial Assistance: [facility]; Financial Assistance: External (7/14/2025  2:39 PM)      OSW outreached to check in following recent surgery and discuss financial support resources. Pt reports that she is doing well and messaged Dr. Nova regarding numbness in her face/ear. OSW relayed Dr. Nova's message sent to her today via My Chart, and encouraged her to outreach with any additional questions.     OSW provided information on [facility] and [facility]. Encouraged pt to confirm her interest in these programs via email and OSW can assist with nominating for financial support. OSW provided information for [facility] Financial Assistance and encouraged her to apply.     Pt is encouraged to outreach with questions or for support. OSW remains available for psychosocial support and resources.

## 2025-07-14 NOTE — PROGRESS NOTES
Otolaryngology - Head and Neck Surgery  [address], [address]  [place] [address]  Phone: [phone]  Fax: [phone]      Patient ID: Yohana Kaye                                  : 1995    Visit Date: 2025    Reason for Follow up  Chief Complaint   Patient presents with    Post-op Visit     History of Present Illness  Yohana Kaye is a 30 y.o. female who presents for follow up. 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  She had a total thyroidectomy and right superficial parotidectomy with facial nerve monitoring on 2025 and discharged home the following day. She had a normal calcium level during her admission.  She is here for postoperative SCOTT drain removal.      She has 2 SCOTT drains and her output has decreased over the past several days.  Her most recent was 5 mL of both drains.  She is doing well.  She feels discomfort where the SCOTT drains are located.  She does not take any pain medications, over-the-counter or prescription.  She denies numbness/paresthesia of her tongue or elsewhere on her face.     Review of Systems  Fourteen point ROS is otherwise negative except for those detailed above in the History of Present Illness.    Current Medications  has a current medication list which includes the following prescription(s): azelastine, cholecalciferol, levothyroxine, ondansetron odt, oxycodone, phenol, and sprintec (28).    Past Medical History  Past Medical History:   Diagnosis Date    Disease of thyroid gland     cancer    Eczema     hx of    Seasonal allergies     hx of       Past Surgical History  Past Surgical History   Procedure Laterality Date    D&c first trimester / tx incomplete / missed / septic / induced       Fracture surgery Left     tiba    Right superficial parotidectomy With facial nerve monitoring Right 2025    Performed [facility] DO at [facility] hospitals    Total  "thyroidectomy N/A 7/11/2025    Performed [facility]ane Nova DO at [facility]       Social History  Social History     Tobacco Use    Smoking status: Former     Types: Electronic Cigarette     Passive exposure: Past    Smokeless tobacco: Never    Tobacco comments:     Quite  2022   Vaping Use    Vaping status: Never Used   Substance Use Topics    Alcohol use: Never    Drug use: Not Currently     Types: Marijuana       Family History  Family History   Problem Relation Name Age of Onset    Fibroids Biological Mother      Lupus Biological Mother      Thyroid disease Biological Father         Allergies  Patient has no known allergies.    Physical Exam  Vitals: Visit Vitals  /80 (BP Location: Left upper arm, Patient Position: Sitting)   Pulse 81   Ht 1.702 m (5' 7\")   Wt 74.8 kg (165 lb)   LMP 07/03/2025   SpO2 99%   BMI 25.84 kg/m²     General:  Well-developed, well-nourished 30 y.o. female in no acute distress.  She was tearful during the process of the drain removal, anticipating pain.   Voice: Normal without hoarseness, breathiness or stridor.  Head: normocephalic, atraumatic  Face: No scars or lesions. Normal symmetry.  Right-sided SCOTT drain was removed.  Around her surgical site there is no erythema, edema or drainage.  No hematomas noted. Steri-Strips are intact.  Eyes:  Extraocular muscles appear grossly intact without signs of conjunctival hemorrhage; sclerae are white.  No ptosis.  Ears: External ear is normal bilaterally.  External auditory normal without stenosis or exostosis. No cerumen. Tympanic membranes clear, mobile and without retraction or scar.  No fluid in the middle ear space. No signs of middle ear infection.  Nose: No obvious external nasal deformity is appreciated  Oral Cavity/oropharynx:  Normal tongue movement. Normal gag reflex. No mucosal lesions or masses, leukoplakia, erythroplakia, ulcers or other abnormalities of the tongue, floor of mouth, buccal mucosa, palate or posterior " pharyngeal wall are noted.  Neck: Her second SCOTT drain was removed.  Around her surgical site there was no erythema, edema or drainage noted.  Her Steri-Strips are intact.  No masses, adenopathy, cervical spasm or thyroid enlargement  Cranial Nerves II through XII: Grossly intact.  Her tongue is midline.  Her smile is symmetric.  Equal strength of bilateral fist . No facial droop.  Lungs: Non-labored breathing    Pertinent radiology and labs reviewed    Assessment and Plan  Yohana Kaye is a 30 y.o. female who presents for a SCOTT drain removal x 2.  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  She had a total thyroidectomy and right superficial parotidectomy with facial nerve monitoring on July 11, 2025.  She tolerated the SCOTT drain removal.     The steri strips will come off on its own.  We discussed scar management.  She is aware the stitches used during surgery are dissolvable.  I advised she use SPF 50 or greater over the scar to avoid it getting darker.  She will consider over the counter scar creams like Mederma.  She knows it will take up to one year for the scar to fully mature.      She will follow up with Dr. Nova in 1-2 weeks.     Thank you for the referral and the opportunity to participate in this patient's care.    Sincerely,         Daxa Addison PA-C  Otolaryngology

## 2025-07-14 NOTE — LETTER
2025     Isela Baca MD  [address]    [place] [facility] [place] [address]    Patient: Yohana Kaye  YOB: 1995  Date of Visit: 2025      Dear Dr. Baca:    Thank you for referring Yohana Kaye to me for evaluation. Below are my notes for this consultation.    If you have questions, please do not hesitate to call me. I look forward to following your patient along with you.         Sincerely,        [place] Sharma        CC: Daxa Crabtree PA C  2025 12:04 PM  Sign when Signing Visit          Otolaryngology - Head and Neck Surgery  [address], [address]  [place] [address]  Phone: [phone]  Fax: [phone]      Patient ID: Yohana Kaye                                  : 1995    Visit Date: 2025    Reason for Follow up  Chief Complaint   Patient presents with   • Post-op Visit     History of Present Illness  Yohana Kaye is a 30 y.o. female who presents for follow up. 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  She had a total thyroidectomy and right superficial parotidectomy with facial nerve monitoring on 2025 and discharged home the following day. She had a normal calcium level during her admission.  She is here for postoperative SCOTT drain removal.      She has 2 SCOTT drains and her output has decreased over the past several days.  Her most recent was 5 mL of both drains.  She is doing well.  She feels discomfort where the SCOTT drains are located.  She does not take any pain medications, over-the-counter or prescription.  She denies numbness/paresthesia of her tongue or elsewhere on her face.     Review of Systems  Fourteen point ROS is otherwise negative except for those detailed above in the History of Present Illness.    Current Medications  has a current medication list which includes the following prescription(s): azelastine,  "cholecalciferol, levothyroxine, ondansetron odt, oxycodone, phenol, and sprintec (28).    Past Medical History  Past Medical History:   Diagnosis Date   • Disease of thyroid gland     cancer   • Eczema     hx of   • Seasonal allergies     hx of       Past Surgical History  Past Surgical History   Procedure Laterality Date   • D&c first trimester / tx incomplete / missed / septic / induced      • Fracture surgery Left     tiba   • Right superficial parotidectomy With facial nerve monitoring Right 2025    Performed by Zane Nova DO at [facility] OR [facility]   • Total thyroidectomy N/A 2025    Performed by Zane Nova DO at [facility] OR [facility]       Social History  Social History     Tobacco Use   • Smoking status: Former     Types: Electronic Cigarette     Passive exposure: Past   • Smokeless tobacco: Never   • Tobacco comments:     Quite     Vaping Use   • Vaping status: Never Used   Substance Use Topics   • Alcohol use: Never   • Drug use: Not Currently     Types: Marijuana       Family History  Family History   Problem Relation Name Age of Onset   • Fibroids Biological Mother     • Lupus Biological Mother     • Thyroid disease Biological Father         Allergies  Patient has no known allergies.    Physical Exam  Vitals: Visit Vitals  /80 (BP Location: Left upper arm, Patient Position: Sitting)   Pulse 81   Ht 1.702 m (5' 7\")   Wt 74.8 kg (165 lb)   LMP 2025   SpO2 99%   BMI 25.84 kg/m²     General:  Well-developed, well-nourished 30 y.o. female in no acute distress.  She was tearful during the process of the drain removal, anticipating pain.   Voice: Normal without hoarseness, breathiness or stridor.  Head: normocephalic, atraumatic  Face: No scars or lesions. Normal symmetry.  Right-sided SCOTT drain was removed.  Around her surgical site there is no erythema, edema or drainage.  No hematomas noted. Steri-Strips are intact.  Eyes:  Extraocular muscles appear grossly intact without signs of " conjunctival hemorrhage; sclerae are white.  No ptosis.  Ears: External ear is normal bilaterally.  External auditory normal without stenosis or exostosis. No cerumen. Tympanic membranes clear, mobile and without retraction or scar.  No fluid in the middle ear space. No signs of middle ear infection.  Nose: No obvious external nasal deformity is appreciated  Oral Cavity/oropharynx:  Normal tongue movement. Normal gag reflex. No mucosal lesions or masses, leukoplakia, erythroplakia, ulcers or other abnormalities of the tongue, floor of mouth, buccal mucosa, palate or posterior pharyngeal wall are noted.  Neck: Her second SCOTT drain was removed.  Around her surgical site there was no erythema, edema or drainage noted.  Her Steri-Strips are intact.  No masses, adenopathy, cervical spasm or thyroid enlargement  Cranial Nerves II through XII: Grossly intact.  Her tongue is midline.  Her smile is symmetric.  Equal strength of bilateral fist . No facial droop.  Lungs: Non-labored breathing    Pertinent radiology and labs reviewed    Assessment and Plan  Yohana Kaye is a 30 y.o. female who presents for a SCOTT drain removal x 2.  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  She had a total thyroidectomy and right superficial parotidectomy with facial nerve monitoring on July 11, 2025.  She tolerated the SCOTT drain removal.     The steri strips will come off on its own.  We discussed scar management.  She is aware the stitches used during surgery are dissolvable.  I advised she use SPF 50 or greater over the scar to avoid it getting darker.  She will consider over the counter scar creams like Mederma.  She knows it will take up to one year for the scar to fully mature.      She will follow up with Dr. Nova in 1-2 weeks.     Thank you for the referral and the opportunity to participate in this patient's care.    Sincerely,         Daxa Addison  PADanielC  Otolaryngology

## 2025-07-15 LAB
CASE RPRT: NORMAL
CLINICAL INFO: NORMAL
PATH REPORT.FINAL DX SPEC: NORMAL
PATH REPORT.GROSS SPEC: NORMAL
PATHOLOGY SYNOPTIC REPORT: NORMAL

## 2025-07-17 RX ORDER — METHYLPREDNISOLONE 4 MG/1
TABLET ORAL
Qty: 1 TABLET | Refills: 0 | Status: SHIPPED | OUTPATIENT
Start: 2025-07-17

## 2025-07-17 RX ORDER — GABAPENTIN 100 MG/1
100 CAPSULE ORAL 2 TIMES DAILY
Qty: 90 CAPSULE | Refills: 6 | Status: SHIPPED | OUTPATIENT
Start: 2025-07-17 | End: 2026-01-13

## 2025-07-28 ENCOUNTER — OFFICE VISIT (OUTPATIENT)
Dept: OTOLARYNGOLOGY | Facility: CLINIC | Age: 30
End: 2025-07-28
Payer: COMMERCIAL

## 2025-07-28 VITALS
WEIGHT: 165 LBS | HEIGHT: 67 IN | OXYGEN SATURATION: 99 % | SYSTOLIC BLOOD PRESSURE: 112 MMHG | DIASTOLIC BLOOD PRESSURE: 70 MMHG | HEART RATE: 91 BPM | BODY MASS INDEX: 25.9 KG/M2

## 2025-07-28 DIAGNOSIS — C73 THYROID CANCER (CMS/HCC): Primary | ICD-10-CM

## 2025-07-28 PROCEDURE — 3008F BODY MASS INDEX DOCD: CPT | Performed by: OTOLARYNGOLOGY

## 2025-07-28 PROCEDURE — 99213 OFFICE O/P EST LOW 20 MIN: CPT | Mod: 25 | Performed by: OTOLARYNGOLOGY

## 2025-07-28 NOTE — LETTER
July 28, 2025     Isela Baca MD  [address]  [address]  [facility] [address]    Patient: Yohana Kaye  YOB: 1995  Date of Visit: 7/28/2025      Dear Dr. Baca:    Thank you for referring Yohana Kaye to me for evaluation. Below are my notes for this consultation.     [facility] Otolaryngology - Head and Neck Surgery is a full-service department that offers care for patients with all otolaryngology ailments. We diagnose and treat conditions of the ears, nose, and throat, and have a comprehensive head and neck cancer service line. The head and neck cancer service line includes surgical, radiation, and medical oncology capabilities, with close collaboration through a multidisciplinary conference (MDC). The MDC is best-in-class, with nurse navigators, speech, nutrition, and speech therapy in attendance.     With extensive experience in the diagnosis, treatment, and surgical management of head and neck cancers, I have dedicated my career to advancing the care and outcomes for patients facing these challenging conditions. I offer treatment for salivary gland tumors, thyroid lesions, neck masses, oral cavity, laryngeal, and oropharyngeal lesions. I am excited to announce that we have just launched the new Transoral Robotic Surgery (TORS) program for oropharyngeal lesions.    If you have questions, please do not hesitate to call me directly or message me on Epic. Elina Post, our , and Brittany Arriola, our nurse navigator, may also be reached to help schedule patients on an urgent basis. I look forward to treating your patient along with you.    Respectfully,        Zane Nova DO, MS, MBS, FACS  Otolaryngologist - Head and Neck Surgeon  Head and Neck Surgical Oncologist          CC: Zane Lopez DO  7/28/2025 10:49 AM  Sign when Signing Visit          Otolaryngology - Head and Neck Surgery  [address], [address]  [place] [address]  Phone: [phone]  Fax: [phone]      Patient ID: Yohana Kaye                                  : 1995    Visit Date: 2025    Reason for Follow up  Chief Complaint   Patient presents with   • Post-op     History of Present Illness  Yohana Kaye is a 30 y.o. female who presents for follow up.  She is status post total thyroidectomy and right superficial parotidectomy.  She reports that she has been doing better and has not had much pain.  She reports some ear numbness but this has also been improving.  She has some pain when she opens her mouth to eat.    Review of Systems  Fourteen point ROS is otherwise negative except for those detailed above in the History of Present Illness.    Current Medications  has a current medication list which includes the following prescription(s): levothyroxine, azelastine, cholecalciferol, gabapentin, methylprednisolone, ondansetron odt, phenol, and sprintec (28).    Past Medical History  Past Medical History:   Diagnosis Date   • Disease of thyroid gland     cancer   • Eczema     hx of   • Seasonal allergies     hx of       I reviewed the Past Medical History and I did not make any changes or additions.    Past Surgical History  Past Surgical History   Procedure Laterality Date   • D&c first trimester / tx incomplete / missed / septic / induced      • Fracture surgery Left     tiba   • Right superficial parotidectomy With facial nerve monitoring Right 2025    Performed by Zane Nova DO at [facility]   • Total thyroidectomy N/A 2025    Performed by Zane Nova DO at [facility] OR [facility]       I reviewed the Past Surgical History and I did not make any changes or additions.    Social History  Social History     Tobacco Use   • Smoking status: Former     Types: Electronic Cigarette     Passive exposure: Past   • Smokeless tobacco: Never   • Tobacco comments:     Quite     Vaping Use   • Vaping status: Never Used   Substance  "Use Topics   • Alcohol use: Never   • Drug use: Not Currently     Types: Marijuana       I reviewed the Social History and I did not make any changes or additions.    Family History  Family History   Problem Relation Name Age of Onset   • Fibroids Biological Mother     • Lupus Biological Mother     • Thyroid disease Biological Father         I reviewed the Family History and I did not make any changes or additions.    Allergies  Patient has no known allergies.    I reviewed the Allergies and I did not make any changes or additions.    Physical Exam  Vitals: Visit Vitals  /70 (BP Location: Left forearm, Patient Position: Sitting)   Pulse 91   Ht 1.702 m (5' 7\")   Wt 74.8 kg (165 lb)   LMP 07/03/2025   SpO2 99%   BMI 25.84 kg/m²     General:  Well-developed, well-nourished 30 y.o. female in no acute distress  Voice: Normal without hoarseness, breathiness or stridor.  Head: normocephalic, atraumatic  Face: No scars or lesions. Normal symmetry; right superficial parotid incision healing well  Eyes:  Extraocular muscles appear grossly intact without signs of conjunctival hemorrhage; sclerae are white  Ears: External ear is normal bilaterally.  External auditory normal without stenosis or exostosis. No cerumen. Tympanic membranes clear, mobile and without retraction or scar.  No fluid in the middle ear space. No signs of middle ear infection.  Nose: No obvious external nasal deformity is appreciated  Oral Cavity/oropharynx:  Normal tongue movement. Normal gag reflex. No mucosal lesions or masses, leukoplakia, erythroplakia, ulcers or other abnormalities of the tongue, floor of mouth, buccal mucosa, palate or posterior pharyngeal wall are noted.  Neck: Expected status post total thyroidectomy postoperative incision healing well  Cranial Nerves II through XII: Grossly intact  Lungs: Non-labored breathing    Pertinent radiology and labs reviewed    Assessment and Plan  Yohana Kaye is a 30 y.o. female status post " total thyroidectomy and superficial parotidectomy.  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  I asked her to follow-up with me in 3 to 4 weeks to check progress.  In the meantime, I have ordered postoperative thyroid labs.  She is tolerating her Synthroid well without any issues.    Thank you for the referral and the opportunity to participate in this patient's care.    Sincerely,        Zane Nova DO, MS, MBS, FACS  Otolaryngologist - Head & Neck Surgeon  Head and Neck Surgical Oncologist

## 2025-07-28 NOTE — PROGRESS NOTES
Otolaryngology - Head and Neck Surgery  [address] [facility] [address], [address]  [place] [address]  Phone: [phone]  Fax: [phone]      Patient ID: Yohana Kaye                                  : 1995    Visit Date: 2025    Reason for Follow up  Chief Complaint   Patient presents with    Post-op     History of Present Illness  Yohana Kaye is a 30 y.o. female who presents for follow up.  She is status post total thyroidectomy and right superficial parotidectomy.  She reports that she has been doing better and has not had much pain.  She reports some ear numbness but this has also been improving.  She has some pain when she opens her mouth to eat.    Review of Systems  Fourteen point ROS is otherwise negative except for those detailed above in the History of Present Illness.    Current Medications  has a current medication list which includes the following prescription(s): levothyroxine, azelastine, cholecalciferol, gabapentin, methylprednisolone, ondansetron odt, phenol, and sprintec (28).    Past Medical History  Past Medical History:   Diagnosis Date    Disease of thyroid gland     cancer    Eczema     hx of    Seasonal allergies     hx of       I reviewed the Past Medical History and I did not make any changes or additions.    Past Surgical History  Past Surgical History   Procedure Laterality Date    D&c first trimester / tx incomplete / missed / septic / induced       Fracture surgery Left     tiba    Right superficial parotidectomy With facial nerve monitoring Right 2025    Performed by Zane Nova DO at [facility] OR [facility]    Total thyroidectomy N/A 2025    Performed by Zane Nova DO at [facility] OR [facility]       I reviewed the Past Surgical History and I did not make any changes or additions.    Social History  Social History     Tobacco Use    Smoking status: Former     Types: Electronic Cigarette     Passive exposure: Past    Smokeless tobacco: Never    Tobacco  "comments:     Quite  2022   Vaping Use    Vaping status: Never Used   Substance Use Topics    Alcohol use: Never    Drug use: Not Currently     Types: Marijuana       I reviewed the Social History and I did not make any changes or additions.    Family History  Family History   Problem Relation Name Age of Onset    Fibroids Biological Mother      Lupus Biological Mother      Thyroid disease Biological Father         I reviewed the Family History and I did not make any changes or additions.    Allergies  Patient has no known allergies.    I reviewed the Allergies and I did not make any changes or additions.    Physical Exam  Vitals: Visit Vitals  /70 (BP Location: Left forearm, Patient Position: Sitting)   Pulse 91   Ht 1.702 m (5' 7\")   Wt 74.8 kg (165 lb)   LMP 07/03/2025   SpO2 99%   BMI 25.84 kg/m²     General:  Well-developed, well-nourished 30 y.o. female in no acute distress  Voice: Normal without hoarseness, breathiness or stridor.  Head: normocephalic, atraumatic  Face: No scars or lesions. Normal symmetry; right superficial parotid incision healing well  Eyes:  Extraocular muscles appear grossly intact without signs of conjunctival hemorrhage; sclerae are white  Ears: External ear is normal bilaterally.  External auditory normal without stenosis or exostosis. No cerumen. Tympanic membranes clear, mobile and without retraction or scar.  No fluid in the middle ear space. No signs of middle ear infection.  Nose: No obvious external nasal deformity is appreciated  Oral Cavity/oropharynx:  Normal tongue movement. Normal gag reflex. No mucosal lesions or masses, leukoplakia, erythroplakia, ulcers or other abnormalities of the tongue, floor of mouth, buccal mucosa, palate or posterior pharyngeal wall are noted.  Neck: Expected status post total thyroidectomy postoperative incision healing well  Cranial Nerves II through XII: Grossly intact  Lungs: Non-labored breathing    Pertinent radiology and labs " reviewed    Assessment and Plan  Yohana Kaye is a 30 y.o. female status post total thyroidectomy and superficial parotidectomy.  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  I asked her to follow-up with me in 3 to 4 weeks to check progress.  In the meantime, I have ordered postoperative thyroid labs.  She is tolerating her Synthroid well without any issues.    Thank you for the referral and the opportunity to participate in this patient's care.    Sincerely,        Zane Nova DO, MS, MBS, FACS  Otolaryngologist - Head & Neck Surgeon  Head and Neck Surgical Oncologist

## 2025-08-07 RX ORDER — ONDANSETRON 4 MG/1
8 TABLET, ORALLY DISINTEGRATING ORAL EVERY 8 HOURS PRN
Qty: 18 TABLET | Refills: 1 | OUTPATIENT
Start: 2025-08-07

## 2025-08-08 RX ORDER — LEVOTHYROXINE SODIUM 125 UG/1
125 TABLET ORAL DAILY
Qty: 30 TABLET | Refills: 0 | OUTPATIENT
Start: 2025-08-08

## 2025-08-11 ENCOUNTER — SOCIAL WORK (OUTPATIENT)
Dept: HEMATOLOGY/ONCOLOGY | Facility: HOSPITAL | Age: 30
End: 2025-08-11
Payer: COMMERCIAL

## 2025-08-12 LAB
CASE RPRT: NORMAL
CLINICAL INFO: NORMAL
PATH REPORT.ADDENDUM SPEC: NORMAL
PATH REPORT.FINAL DX SPEC: NORMAL
PATH REPORT.FINAL DX SPEC: NORMAL
PATH REPORT.GROSS SPEC: NORMAL

## 2025-08-13 ENCOUNTER — SOCIAL WORK (OUTPATIENT)
Dept: HEMATOLOGY/ONCOLOGY | Facility: HOSPITAL | Age: 30
End: 2025-08-13
Payer: COMMERCIAL

## 2025-08-14 ENCOUNTER — PATIENT OUTREACH (OUTPATIENT)
Dept: COMMUNITY HEALTH | Facility: CLINIC | Age: 30
End: 2025-08-14
Payer: COMMERCIAL

## 2025-08-19 ENCOUNTER — SOCIAL WORK (OUTPATIENT)
Dept: HEMATOLOGY/ONCOLOGY | Facility: HOSPITAL | Age: 30
End: 2025-08-19
Payer: COMMERCIAL

## 2025-08-21 ENCOUNTER — PATIENT OUTREACH (OUTPATIENT)
Dept: COMMUNITY HEALTH | Facility: CLINIC | Age: 30
End: 2025-08-21
Payer: COMMERCIAL

## 2025-08-21 ENCOUNTER — SOCIAL WORK (OUTPATIENT)
Dept: HEMATOLOGY/ONCOLOGY | Facility: HOSPITAL | Age: 30
End: 2025-08-21
Payer: COMMERCIAL

## 2025-08-21 RX ORDER — LEVOTHYROXINE SODIUM 125 UG/1
125 TABLET ORAL DAILY
Qty: 30 TABLET | Refills: 0 | OUTPATIENT
Start: 2025-08-21

## 2025-08-26 ENCOUNTER — SOCIAL WORK (OUTPATIENT)
Dept: HEMATOLOGY/ONCOLOGY | Facility: HOSPITAL | Age: 30
End: 2025-08-26
Payer: COMMERCIAL

## 2025-08-27 ENCOUNTER — TELEPHONE (OUTPATIENT)
Dept: OTOLARYNGOLOGY | Facility: CLINIC | Age: 30
End: 2025-08-27

## 2025-08-27 ENCOUNTER — SOCIAL WORK (OUTPATIENT)
Dept: HEMATOLOGY/ONCOLOGY | Facility: HOSPITAL | Age: 30
End: 2025-08-27
Payer: COMMERCIAL

## 2025-08-27 ENCOUNTER — OFFICE VISIT (OUTPATIENT)
Dept: OTOLARYNGOLOGY | Facility: CLINIC | Age: 30
End: 2025-08-27
Payer: COMMERCIAL

## 2025-08-27 VITALS
BODY MASS INDEX: 25.9 KG/M2 | HEIGHT: 67 IN | DIASTOLIC BLOOD PRESSURE: 80 MMHG | OXYGEN SATURATION: 99 % | HEART RATE: 67 BPM | WEIGHT: 165 LBS | SYSTOLIC BLOOD PRESSURE: 110 MMHG

## 2025-08-27 DIAGNOSIS — K11.8 MASS OF RIGHT PAROTID GLAND: ICD-10-CM

## 2025-08-27 DIAGNOSIS — C73 MALIGNANT NEOPLASM OF THYROID GLAND (CMS/HCC): Primary | ICD-10-CM

## 2025-08-27 PROCEDURE — 99214 OFFICE O/P EST MOD 30 MIN: CPT | Mod: 25 | Performed by: OTOLARYNGOLOGY

## 2025-08-27 PROCEDURE — 3008F BODY MASS INDEX DOCD: CPT | Performed by: OTOLARYNGOLOGY

## 2025-08-27 RX ORDER — LEVOTHYROXINE SODIUM 125 UG/1
125 TABLET ORAL
Qty: 30 TABLET | Refills: 3 | Status: SHIPPED | OUTPATIENT
Start: 2025-08-27 | End: 2025-09-26

## 2025-08-28 ENCOUNTER — PATIENT OUTREACH (OUTPATIENT)
Dept: COMMUNITY HEALTH | Facility: CLINIC | Age: 30
End: 2025-08-28
Payer: COMMERCIAL

## 2025-09-02 ENCOUNTER — SOCIAL WORK (OUTPATIENT)
Dept: HEMATOLOGY/ONCOLOGY | Facility: HOSPITAL | Age: 30
End: 2025-09-02
Payer: COMMERCIAL

## 2025-09-03 ENCOUNTER — PATIENT OUTREACH (OUTPATIENT)
Dept: COMMUNITY HEALTH | Facility: CLINIC | Age: 30
End: 2025-09-03
Payer: COMMERCIAL

## (undated) DEVICE — SYRINGE 10CC BULK (BOX=20EA)

## (undated) DEVICE — GAUZE SPONGE 2X2 4PLY STRL 2S

## (undated) DEVICE — MANIFOLD SINGLE PORT NEPTUNE

## (undated) DEVICE — STERISTRIP 1/2INX4IN

## (undated) DEVICE — COVER LIGHTHANDLE STERILE BLUE

## (undated) DEVICE — PROBE PRASS MONOPOLAR STIMULATOR

## (undated) DEVICE — Device

## (undated) DEVICE — RETRACTOR STAYS 3350-1G 12MM

## (undated) DEVICE — DRESSING TEGADERM 2 3/8 X 2 3/4

## (undated) DEVICE — YELLOW LONE STAR STAY

## (undated) DEVICE — NEEDLE DISP HYPO 25GX1-1/2IN

## (undated) DEVICE — PAD GROUND ELECTROSURGICAL W/CORD

## (undated) DEVICE — BLADE SCALPEL #15

## (undated) DEVICE — SOLN BETADINE 4 OZ

## (undated) DEVICE — CORD BIPOLAR CODMAN DISPOSABLE 10-CS

## (undated) DEVICE — GAUZE 8X4 16 PLY RFID DOUBLE XRAY

## (undated) DEVICE — SHEET DRAPE 3/4 REVERSEFOLD 53X77

## (undated) DEVICE — BALLS COTTON STERILE

## (undated) DEVICE — SUTURE PLAIN GUT 5-0   1915G

## (undated) DEVICE — APPLIER CLIP 20 MED 9.75 SURGICLIP

## (undated) DEVICE — RESERVOIR DRAIN 100ML

## (undated) DEVICE — TUBING SMOKE EVAC PENCIL COATED

## (undated) DEVICE — SYRINGE DISP LUER-LOK 10 CC

## (undated) DEVICE — LIGASURE EXACT DISSECTOR

## (undated) DEVICE — TUBE ET FOR NIM THYROID 7FR

## (undated) DEVICE — ELECTRODE SUBDERMAL PAIRED

## (undated) DEVICE — GLOVE SURG PROTEXIS PF 8

## (undated) DEVICE — DRESSING TEGADERM 4X4 3/4

## (undated) DEVICE — GOWN SIRUS FABRNF RAGLAN L ST 30/CS

## (undated) DEVICE — EVACUATOR CLOSED SUCTION 100C

## (undated) DEVICE — PACK RFID ENT

## (undated) DEVICE — GAUZE STERILE 4X4 16PLY

## (undated) DEVICE — TOWEL DISPOSABLE OR

## (undated) DEVICE — SLEEVE SCD COMFORT KNEE LENGTH MED

## (undated) DEVICE — GLOVE SZ 8 LINER PROTEXIS PI BL

## (undated) DEVICE — SOLN IRRIG .9%SOD 1000ML

## (undated) DEVICE — STAPLER SKIN

## (undated) DEVICE — SUTURE POLYSORB 3-0 UNDYED 5X18 V-20 D-TACH

## (undated) DEVICE — SURGICEL HEMOSTATIC GAUZE 2 X14

## (undated) DEVICE — GLOVE SZ 7.5 PROTEXIS PI

## (undated) DEVICE — TIP BOVIE BLADE COATED INSULATED 2.75IN

## (undated) DEVICE — SOLN IRRIG STER WATER 1000ML

## (undated) DEVICE — SPONGE DISSECTOR X-RAY DETECTABLE 9/16 X 7/4IN STERILE

## (undated) DEVICE — BLANKET LOWER BODY

## (undated) DEVICE — DRAIN JACKSON PRATT 15FR ROUND W/TROCAR

## (undated) DEVICE — SUTURE SOFSILK 2-0 BLACK 1X30 V-20